# Patient Record
Sex: MALE | Race: WHITE | NOT HISPANIC OR LATINO | URBAN - METROPOLITAN AREA
[De-identification: names, ages, dates, MRNs, and addresses within clinical notes are randomized per-mention and may not be internally consistent; named-entity substitution may affect disease eponyms.]

---

## 2017-01-12 ENCOUNTER — EMERGENCY (EMERGENCY)
Facility: HOSPITAL | Age: 82
LOS: 1 days | Discharge: HOME | End: 2017-01-12
Attending: EMERGENCY MEDICINE
Payer: MEDICARE

## 2017-01-12 VITALS
WEIGHT: 210.1 LBS | DIASTOLIC BLOOD PRESSURE: 70 MMHG | OXYGEN SATURATION: 97 % | HEIGHT: 70 IN | RESPIRATION RATE: 20 BRPM | HEART RATE: 82 BPM | TEMPERATURE: 99 F | SYSTOLIC BLOOD PRESSURE: 141 MMHG

## 2017-01-12 DIAGNOSIS — N40.0 BENIGN PROSTATIC HYPERPLASIA WITHOUT LOWER URINARY TRACT SYMPTOMS: ICD-10-CM

## 2017-01-12 DIAGNOSIS — R33.9 RETENTION OF URINE, UNSPECIFIED: ICD-10-CM

## 2017-01-12 DIAGNOSIS — F02.80 DEMENTIA IN OTHER DISEASES CLASSIFIED ELSEWHERE, UNSPECIFIED SEVERITY, WITHOUT BEHAVIORAL DISTURBANCE, PSYCHOTIC DISTURBANCE, MOOD DISTURBANCE, AND ANXIETY: ICD-10-CM

## 2017-01-12 DIAGNOSIS — G20 PARKINSON'S DISEASE: ICD-10-CM

## 2017-01-12 DIAGNOSIS — G30.9 ALZHEIMER'S DISEASE, UNSPECIFIED: ICD-10-CM

## 2017-01-12 LAB
ANION GAP SERPL CALC-SCNC: 8 MMOL/L — SIGNIFICANT CHANGE UP (ref 5–17)
APPEARANCE UR: CLEAR — SIGNIFICANT CHANGE UP
BASOPHILS # BLD AUTO: 0.1 K/UL — SIGNIFICANT CHANGE UP (ref 0–0.2)
BASOPHILS NFR BLD AUTO: 0.8 % — SIGNIFICANT CHANGE UP (ref 0–2)
BILIRUB UR-MCNC: NEGATIVE — SIGNIFICANT CHANGE UP
BUN SERPL-MCNC: 23 MG/DL — HIGH (ref 7–18)
CALCIUM SERPL-MCNC: 8.3 MG/DL — LOW (ref 8.4–10.5)
CHLORIDE SERPL-SCNC: 113 MMOL/L — HIGH (ref 96–108)
CO2 SERPL-SCNC: 21 MMOL/L — LOW (ref 22–31)
COLOR SPEC: YELLOW — SIGNIFICANT CHANGE UP
CREAT SERPL-MCNC: 1.47 MG/DL — HIGH (ref 0.5–1.3)
DIFF PNL FLD: NEGATIVE — SIGNIFICANT CHANGE UP
EOSINOPHIL # BLD AUTO: 0.4 K/UL — SIGNIFICANT CHANGE UP (ref 0–0.5)
EOSINOPHIL NFR BLD AUTO: 4.3 % — SIGNIFICANT CHANGE UP (ref 0–6)
GLUCOSE SERPL-MCNC: 120 MG/DL — HIGH (ref 70–99)
GLUCOSE UR QL: NEGATIVE — SIGNIFICANT CHANGE UP
HCT VFR BLD CALC: 26.6 % — LOW (ref 39–50)
HGB BLD-MCNC: 8.2 G/DL — LOW (ref 13–17)
KETONES UR-MCNC: NEGATIVE — SIGNIFICANT CHANGE UP
LEUKOCYTE ESTERASE UR-ACNC: NEGATIVE — SIGNIFICANT CHANGE UP
LYMPHOCYTES # BLD AUTO: 2.2 K/UL — SIGNIFICANT CHANGE UP (ref 1–3.3)
LYMPHOCYTES # BLD AUTO: 22.8 % — SIGNIFICANT CHANGE UP (ref 13–44)
MCHC RBC-ENTMCNC: 23.1 PG — LOW (ref 27–34)
MCHC RBC-ENTMCNC: 30.7 GM/DL — LOW (ref 32–36)
MCV RBC AUTO: 75.3 FL — LOW (ref 80–100)
MONOCYTES # BLD AUTO: 0.7 K/UL — SIGNIFICANT CHANGE UP (ref 0–0.9)
MONOCYTES NFR BLD AUTO: 7.5 % — SIGNIFICANT CHANGE UP (ref 2–14)
NEUTROPHILS # BLD AUTO: 6.1 K/UL — SIGNIFICANT CHANGE UP (ref 1.8–7.4)
NEUTROPHILS NFR BLD AUTO: 64.7 % — SIGNIFICANT CHANGE UP (ref 43–77)
NITRITE UR-MCNC: NEGATIVE — SIGNIFICANT CHANGE UP
PH UR: 6 — SIGNIFICANT CHANGE UP (ref 4.8–8)
PLATELET # BLD AUTO: 243 K/UL — SIGNIFICANT CHANGE UP (ref 150–400)
POTASSIUM SERPL-MCNC: 4.2 MMOL/L — SIGNIFICANT CHANGE UP (ref 3.5–5.3)
POTASSIUM SERPL-SCNC: 4.2 MMOL/L — SIGNIFICANT CHANGE UP (ref 3.5–5.3)
PROT UR-MCNC: NEGATIVE — SIGNIFICANT CHANGE UP
RBC # BLD: 3.53 M/UL — LOW (ref 4.2–5.8)
RBC # FLD: 16.9 % — HIGH (ref 10.3–14.5)
SODIUM SERPL-SCNC: 142 MMOL/L — SIGNIFICANT CHANGE UP (ref 135–145)
SP GR SPEC: 1.01 — SIGNIFICANT CHANGE UP (ref 1.01–1.02)
UROBILINOGEN FLD QL: NEGATIVE — SIGNIFICANT CHANGE UP
WBC # BLD: 9.5 K/UL — SIGNIFICANT CHANGE UP (ref 3.8–10.5)
WBC # FLD AUTO: 9.5 K/UL — SIGNIFICANT CHANGE UP (ref 3.8–10.5)

## 2017-01-12 PROCEDURE — 99285 EMERGENCY DEPT VISIT HI MDM: CPT | Mod: 25

## 2017-01-12 PROCEDURE — 51702 INSERT TEMP BLADDER CATH: CPT

## 2017-01-12 PROCEDURE — 81003 URINALYSIS AUTO W/O SCOPE: CPT

## 2017-01-12 PROCEDURE — 80048 BASIC METABOLIC PNL TOTAL CA: CPT

## 2017-01-12 PROCEDURE — 85027 COMPLETE CBC AUTOMATED: CPT

## 2017-01-12 RX ORDER — SODIUM CHLORIDE 9 MG/ML
3 INJECTION INTRAMUSCULAR; INTRAVENOUS; SUBCUTANEOUS EVERY 8 HOURS
Qty: 0 | Refills: 0 | Status: DISCONTINUED | OUTPATIENT
Start: 2017-01-12 | End: 2017-01-16

## 2017-01-12 RX ADMIN — SODIUM CHLORIDE 3 MILLILITER(S): 9 INJECTION INTRAMUSCULAR; INTRAVENOUS; SUBCUTANEOUS at 23:33

## 2017-01-12 RX ADMIN — SODIUM CHLORIDE 3 MILLILITER(S): 9 INJECTION INTRAMUSCULAR; INTRAVENOUS; SUBCUTANEOUS at 22:36

## 2017-01-12 NOTE — ED PROVIDER NOTE - MUSCULOSKELETAL, MLM
Spine appears normal, range of motion is not limited in upper extremities, no muscle or joint tenderness

## 2017-01-12 NOTE — ED PROVIDER NOTE - MEDICAL DECISION MAKING DETAILS
87 y/o M pt c/o urinary retention. Reviewed outside labs, prior creatinine 1.42, will check CBC and BMP, place Solomon and send UA. Disposition dependent on worsening renal failure or UTI for possible admission, otherwise plan for D/C home by ambulette. 85 y/o M pt c/o urinary retention. Reviewed outside labs, prior creatinine 1.42, will check CBC and BMP, place Solomon and send UA. Disposition dependent on worsening renal failure or UTI for possible admission, otherwise plan for D/C home by ambulette. Cr at baseline. no UTI. pt to f/u with his urologist for urinary retention. Pt is well appearing, stable for discharge and follow up without fail with medical doctor. I had a detailed discussion with the patient and/or guardian regarding the historical points, exam findings, and any diagnostic results supporting the discharge diagnosis. Pt educated on care and need for follow up. Strict return instructions and red flag signs and symptoms discussed with patient. Medications for discharge discussed and adverse effects reviewed. Questions answered. Pt shows understanding of discharge information and agrees to follow.

## 2017-01-12 NOTE — ED ADULT NURSE NOTE - OBJECTIVE STATEMENT
Bpresented to ed with c/o unable to urinate x 24 hrs.bld.drawn and sent to lab. pedro cath in place draining to yellowish urine output with 1000cc

## 2017-01-12 NOTE — ED PROVIDER NOTE - PMH
Achilles rupture    Alzheimer's dementia    Anemia    BPH (benign prostatic hypertrophy)    CAD (coronary artery disease)    Parkinson's disease    Pneumonia    Seizure

## 2017-01-12 NOTE — ED PROVIDER NOTE - CRANIAL NERVE AND PUPILLARY EXAM
extra-ocular movements intact/Pt not moving lower extremities/central and peripheral vision intact/tongue is midline/corneal reflex intact/cranial nerves 2-12 intact

## 2017-01-12 NOTE — ED PROVIDER NOTE - NS ED MD SCRIBE ATTENDING SCRIBE SECTIONS
REVIEW OF SYSTEMS/PAST MEDICAL/SURGICAL/SOCIAL HISTORY/VITAL SIGNS( Pullset)/PHYSICAL EXAM/DISPOSITION/HISTORY OF PRESENT ILLNESS

## 2017-01-12 NOTE — ED PROVIDER NOTE - OBJECTIVE STATEMENT
85 y/o M pt w/ PMHx of Alzheimer's dementia, Parkinson's, seizures, anemia, CAD, and BPH presents to ED c/o worsening urinary retention x3 days. Per wife pt's last urination was today morning. Pt denies fever, chest pain, abd pain, dysuria, hematuria, or any other complaints. Pt is already on Tamsulosin for BPH and sees a Urologist at [facility]; pt has not seen Urologist recently due to bed bound status per wife. Pt reports feeling baseline, normal self, no complaints. NKDA.

## 2017-01-13 VITALS
TEMPERATURE: 99 F | RESPIRATION RATE: 20 BRPM | DIASTOLIC BLOOD PRESSURE: 67 MMHG | SYSTOLIC BLOOD PRESSURE: 117 MMHG | HEART RATE: 80 BPM | OXYGEN SATURATION: 100 %

## 2017-03-27 ENCOUNTER — EMERGENCY (EMERGENCY)
Facility: HOSPITAL | Age: 82
LOS: 1 days | Discharge: HOME | End: 2017-03-27
Attending: EMERGENCY MEDICINE
Payer: MEDICARE

## 2017-03-27 VITALS
RESPIRATION RATE: 18 BRPM | DIASTOLIC BLOOD PRESSURE: 57 MMHG | SYSTOLIC BLOOD PRESSURE: 100 MMHG | TEMPERATURE: 98 F | HEART RATE: 84 BPM | OXYGEN SATURATION: 98 %

## 2017-03-27 VITALS
HEIGHT: 70 IN | HEART RATE: 69 BPM | TEMPERATURE: 99 F | RESPIRATION RATE: 16 BRPM | OXYGEN SATURATION: 99 % | DIASTOLIC BLOOD PRESSURE: 78 MMHG | SYSTOLIC BLOOD PRESSURE: 121 MMHG | WEIGHT: 190.04 LBS

## 2017-03-27 LAB
ALBUMIN SERPL ELPH-MCNC: 3.6 G/DL — SIGNIFICANT CHANGE UP (ref 3.5–5)
ALP SERPL-CCNC: 89 U/L — SIGNIFICANT CHANGE UP (ref 40–120)
ALT FLD-CCNC: 9 U/L DA — LOW (ref 10–60)
ANION GAP SERPL CALC-SCNC: 10 MMOL/L — SIGNIFICANT CHANGE UP (ref 5–17)
APPEARANCE UR: CLEAR — SIGNIFICANT CHANGE UP
APTT BLD: 28.3 SEC — SIGNIFICANT CHANGE UP (ref 27.5–37.4)
AST SERPL-CCNC: 9 U/L — LOW (ref 10–40)
BASOPHILS # BLD AUTO: 0.1 K/UL — SIGNIFICANT CHANGE UP (ref 0–0.2)
BASOPHILS NFR BLD AUTO: 1 % — SIGNIFICANT CHANGE UP (ref 0–2)
BILIRUB SERPL-MCNC: 0.4 MG/DL — SIGNIFICANT CHANGE UP (ref 0.2–1.2)
BILIRUB UR-MCNC: NEGATIVE — SIGNIFICANT CHANGE UP
BUN SERPL-MCNC: 26 MG/DL — HIGH (ref 7–18)
CALCIUM SERPL-MCNC: 8.9 MG/DL — SIGNIFICANT CHANGE UP (ref 8.4–10.5)
CHLORIDE SERPL-SCNC: 112 MMOL/L — HIGH (ref 96–108)
CO2 SERPL-SCNC: 22 MMOL/L — SIGNIFICANT CHANGE UP (ref 22–31)
COLOR SPEC: YELLOW — SIGNIFICANT CHANGE UP
CREAT SERPL-MCNC: 1.64 MG/DL — HIGH (ref 0.5–1.3)
DIFF PNL FLD: NEGATIVE — SIGNIFICANT CHANGE UP
EOSINOPHIL # BLD AUTO: 0.3 K/UL — SIGNIFICANT CHANGE UP (ref 0–0.5)
EOSINOPHIL NFR BLD AUTO: 2.6 % — SIGNIFICANT CHANGE UP (ref 0–6)
GLUCOSE SERPL-MCNC: 104 MG/DL — HIGH (ref 70–99)
GLUCOSE UR QL: NEGATIVE — SIGNIFICANT CHANGE UP
HCT VFR BLD CALC: 37.6 % — LOW (ref 39–50)
HGB BLD-MCNC: 11.6 G/DL — LOW (ref 13–17)
INR BLD: 1.1 RATIO — SIGNIFICANT CHANGE UP (ref 0.88–1.16)
KETONES UR-MCNC: NEGATIVE — SIGNIFICANT CHANGE UP
LACTATE SERPL-SCNC: 1.5 MMOL/L — SIGNIFICANT CHANGE UP (ref 0.7–2)
LEUKOCYTE ESTERASE UR-ACNC: NEGATIVE — SIGNIFICANT CHANGE UP
LIDOCAIN IGE QN: 139 U/L — SIGNIFICANT CHANGE UP (ref 73–393)
LYMPHOCYTES # BLD AUTO: 2.8 K/UL — SIGNIFICANT CHANGE UP (ref 1–3.3)
LYMPHOCYTES # BLD AUTO: 27.8 % — SIGNIFICANT CHANGE UP (ref 13–44)
MCHC RBC-ENTMCNC: 26.1 PG — LOW (ref 27–34)
MCHC RBC-ENTMCNC: 30.8 GM/DL — LOW (ref 32–36)
MCV RBC AUTO: 84.8 FL — SIGNIFICANT CHANGE UP (ref 80–100)
MONOCYTES # BLD AUTO: 0.7 K/UL — SIGNIFICANT CHANGE UP (ref 0–0.9)
MONOCYTES NFR BLD AUTO: 6.7 % — SIGNIFICANT CHANGE UP (ref 2–14)
NEUTROPHILS # BLD AUTO: 6.2 K/UL — SIGNIFICANT CHANGE UP (ref 1.8–7.4)
NEUTROPHILS NFR BLD AUTO: 61.9 % — SIGNIFICANT CHANGE UP (ref 43–77)
NITRITE UR-MCNC: NEGATIVE — SIGNIFICANT CHANGE UP
PH UR: 6 — SIGNIFICANT CHANGE UP (ref 4.8–8)
PLATELET # BLD AUTO: 213 K/UL — SIGNIFICANT CHANGE UP (ref 150–400)
POTASSIUM SERPL-MCNC: 4.1 MMOL/L — SIGNIFICANT CHANGE UP (ref 3.5–5.3)
POTASSIUM SERPL-SCNC: 4.1 MMOL/L — SIGNIFICANT CHANGE UP (ref 3.5–5.3)
PROT SERPL-MCNC: 7.6 G/DL — SIGNIFICANT CHANGE UP (ref 6–8.3)
PROT UR-MCNC: NEGATIVE — SIGNIFICANT CHANGE UP
PROTHROM AB SERPL-ACNC: 12 SEC — SIGNIFICANT CHANGE UP (ref 9.8–12.7)
RBC # BLD: 4.43 M/UL — SIGNIFICANT CHANGE UP (ref 4.2–5.8)
RBC # FLD: 17.3 % — HIGH (ref 10.3–14.5)
SODIUM SERPL-SCNC: 144 MMOL/L — SIGNIFICANT CHANGE UP (ref 135–145)
SP GR SPEC: 1.02 — SIGNIFICANT CHANGE UP (ref 1.01–1.02)
UROBILINOGEN FLD QL: NEGATIVE — SIGNIFICANT CHANGE UP
WBC # BLD: 10.1 K/UL — SIGNIFICANT CHANGE UP (ref 3.8–10.5)
WBC # FLD AUTO: 10.1 K/UL — SIGNIFICANT CHANGE UP (ref 3.8–10.5)

## 2017-03-27 PROCEDURE — 80053 COMPREHEN METABOLIC PANEL: CPT

## 2017-03-27 PROCEDURE — 83690 ASSAY OF LIPASE: CPT

## 2017-03-27 PROCEDURE — 83605 ASSAY OF LACTIC ACID: CPT

## 2017-03-27 PROCEDURE — 99285 EMERGENCY DEPT VISIT HI MDM: CPT

## 2017-03-27 PROCEDURE — 81003 URINALYSIS AUTO W/O SCOPE: CPT

## 2017-03-27 PROCEDURE — 99284 EMERGENCY DEPT VISIT MOD MDM: CPT | Mod: 25

## 2017-03-27 PROCEDURE — 74176 CT ABD & PELVIS W/O CONTRAST: CPT | Mod: 26

## 2017-03-27 PROCEDURE — 85027 COMPLETE CBC AUTOMATED: CPT

## 2017-03-27 PROCEDURE — 85610 PROTHROMBIN TIME: CPT

## 2017-03-27 PROCEDURE — 74176 CT ABD & PELVIS W/O CONTRAST: CPT

## 2017-03-27 PROCEDURE — 85730 THROMBOPLASTIN TIME PARTIAL: CPT

## 2017-03-27 RX ORDER — SODIUM CHLORIDE 9 MG/ML
500 INJECTION INTRAMUSCULAR; INTRAVENOUS; SUBCUTANEOUS ONCE
Qty: 0 | Refills: 0 | Status: COMPLETED | OUTPATIENT
Start: 2017-03-27 | End: 2017-03-27

## 2017-03-27 RX ADMIN — SODIUM CHLORIDE 500 MILLILITER(S): 9 INJECTION INTRAMUSCULAR; INTRAVENOUS; SUBCUTANEOUS at 16:31

## 2017-03-27 NOTE — ED PROVIDER NOTE - PHYSICAL EXAMINATION
GI: no grimacing during abdominal palpation; normal bowel sounds   MSKL: no grimacing during palpation of calves

## 2017-03-27 NOTE — ED PROVIDER NOTE - MEDICAL DECISION MAKING DETAILS
85 y/o M with intermittent abdominal distension x 3 days. Pt is Pt is otherwise well appearing, vital signs are within normal limits and afebrile.  Plan to obtain labs, urine, CT to r/o obstruction and reassess.

## 2017-03-27 NOTE — ED PROVIDER NOTE - PROGRESS NOTE DETAILS
The scribe's documentation has been prepared under my direction and personally reviewed by me in its entirety. I confirm that the note above actually reflects all work, treatment, procedures, and medical decision-making performed by me - THA Renee Creat 1.64, will give  cc. CT shows rectal distention with stool. Performed digital exam and removed moderate amount of brown stool, non-tender rectal area, will give enema and discharge with PMD follow up. Patient already on Colace and wife said that she will restart the lactulose. Will book ambulette and give a copy of all results to wife.

## 2017-03-27 NOTE — ED PROVIDER NOTE - CONDUCTED A DETAILED DISCUSSION WITH PATIENT AND/OR GUARDIAN REGARDING, MDM
return to ED if symptoms worsen, persist or questions arise need for outpatient follow-up/lab results/return to ED if symptoms worsen, persist or questions arise/radiology results

## 2017-03-27 NOTE — ED PROVIDER NOTE - OBJECTIVE STATEMENT
87 y/o M pt with PMHx of dementia, parkinson's disease, BPH, and cardiac stent on aspirin presents to the ED BIB family for abdominal distention x 2 days. Pt's wife notes relief yesterday after BM, however, distension returned today. Denies SOB, fever, skin flushing, no bloody/ black stools or any other complaints at this time. NKDA.  nonverbal therefore limited HPI 87 y/o M pt with PMHx of advanced dementia, parkinson's disease, BPH, and cardiac stent on aspirin presents to the ED BIB family for abdominal distention x 2 days. Pt's wife notes relief yesterday after BM, however, distension returned today. Wife and aide deny witnessing SOB, fever, skin flushing, no bloody/ black stools or any other complaints at this time. NKDA.  nonverbal therefore limited HPI

## 2017-03-27 NOTE — ED PROVIDER NOTE - ATTENDING CONTRIBUTION TO CARE
patient with abdominal distension. nontender abdomen on palpation, nonverbal patient. CT reveals large amount of stool in vault. some stool removed in Emergency Department digitally by Fabiola. home with primary care physician followup

## 2017-03-27 NOTE — ED PROVIDER NOTE - NS ED MD SCRIBE ATTENDING SCRIBE SECTIONS
HISTORY OF PRESENT ILLNESS/REVIEW OF SYSTEMS/VITAL SIGNS( Pullset)/DISPOSITION/PHYSICAL EXAM/PAST MEDICAL/SURGICAL/SOCIAL HISTORY

## 2017-03-27 NOTE — ED ADULT NURSE NOTE - OBJECTIVE STATEMENT
RN HERBER SY covering notes: Patient received via ambulance as per wife patient been complaining of abdominal pain denies any NVD. Patient is AOX1 non ambulatory noted with abdominal distention

## 2017-03-31 DIAGNOSIS — K59.00 CONSTIPATION, UNSPECIFIED: ICD-10-CM

## 2017-03-31 DIAGNOSIS — D64.9 ANEMIA, UNSPECIFIED: ICD-10-CM

## 2017-03-31 DIAGNOSIS — R14.0 ABDOMINAL DISTENSION (GASEOUS): ICD-10-CM

## 2017-03-31 DIAGNOSIS — G30.9 ALZHEIMER'S DISEASE, UNSPECIFIED: ICD-10-CM

## 2017-03-31 DIAGNOSIS — G40.909 EPILEPSY, UNSPECIFIED, NOT INTRACTABLE, WITHOUT STATUS EPILEPTICUS: ICD-10-CM

## 2017-03-31 DIAGNOSIS — I25.10 ATHEROSCLEROTIC HEART DISEASE OF NATIVE CORONARY ARTERY WITHOUT ANGINA PECTORIS: ICD-10-CM

## 2017-03-31 DIAGNOSIS — G20 PARKINSON'S DISEASE: ICD-10-CM

## 2017-03-31 DIAGNOSIS — N40.0 BENIGN PROSTATIC HYPERPLASIA WITHOUT LOWER URINARY TRACT SYMPTOMS: ICD-10-CM

## 2017-03-31 DIAGNOSIS — F02.80 DEMENTIA IN OTHER DISEASES CLASSIFIED ELSEWHERE, UNSPECIFIED SEVERITY, WITHOUT BEHAVIORAL DISTURBANCE, PSYCHOTIC DISTURBANCE, MOOD DISTURBANCE, AND ANXIETY: ICD-10-CM

## 2017-09-18 ENCOUNTER — INPATIENT (INPATIENT)
Facility: HOSPITAL | Age: 82
LOS: 1 days | Discharge: HOSPICE | DRG: 871 | End: 2017-09-20
Attending: INTERNAL MEDICINE | Admitting: INTERNAL MEDICINE
Payer: MEDICARE

## 2017-09-18 VITALS
HEART RATE: 98 BPM | DIASTOLIC BLOOD PRESSURE: 57 MMHG | OXYGEN SATURATION: 95 % | RESPIRATION RATE: 26 BRPM | SYSTOLIC BLOOD PRESSURE: 85 MMHG | WEIGHT: 220.02 LBS

## 2017-09-18 DIAGNOSIS — G93.41 METABOLIC ENCEPHALOPATHY: ICD-10-CM

## 2017-09-18 DIAGNOSIS — N17.9 ACUTE KIDNEY FAILURE, UNSPECIFIED: ICD-10-CM

## 2017-09-18 DIAGNOSIS — J18.9 PNEUMONIA, UNSPECIFIED ORGANISM: ICD-10-CM

## 2017-09-18 DIAGNOSIS — G20 PARKINSON'S DISEASE: ICD-10-CM

## 2017-09-18 DIAGNOSIS — N28.9 DISORDER OF KIDNEY AND URETER, UNSPECIFIED: ICD-10-CM

## 2017-09-18 DIAGNOSIS — E87.0 HYPEROSMOLALITY AND HYPERNATREMIA: ICD-10-CM

## 2017-09-18 DIAGNOSIS — R53.2 FUNCTIONAL QUADRIPLEGIA: ICD-10-CM

## 2017-09-18 DIAGNOSIS — R62.7 ADULT FAILURE TO THRIVE: ICD-10-CM

## 2017-09-18 DIAGNOSIS — R13.10 DYSPHAGIA, UNSPECIFIED: ICD-10-CM

## 2017-09-18 DIAGNOSIS — R13.12 DYSPHAGIA, OROPHARYNGEAL PHASE: ICD-10-CM

## 2017-09-18 DIAGNOSIS — Z29.9 ENCOUNTER FOR PROPHYLACTIC MEASURES, UNSPECIFIED: ICD-10-CM

## 2017-09-18 DIAGNOSIS — G93.40 ENCEPHALOPATHY, UNSPECIFIED: ICD-10-CM

## 2017-09-18 DIAGNOSIS — I25.10 ATHEROSCLEROTIC HEART DISEASE OF NATIVE CORONARY ARTERY WITHOUT ANGINA PECTORIS: ICD-10-CM

## 2017-09-18 DIAGNOSIS — Z51.5 ENCOUNTER FOR PALLIATIVE CARE: ICD-10-CM

## 2017-09-18 DIAGNOSIS — Z71.89 OTHER SPECIFIED COUNSELING: ICD-10-CM

## 2017-09-18 LAB
ALBUMIN SERPL ELPH-MCNC: 2.8 G/DL — LOW (ref 3.5–5)
ALP SERPL-CCNC: 75 U/L — SIGNIFICANT CHANGE UP (ref 40–120)
ALT FLD-CCNC: 7 U/L DA — LOW (ref 10–60)
ANION GAP SERPL CALC-SCNC: 13 MMOL/L — SIGNIFICANT CHANGE UP (ref 5–17)
ANION GAP SERPL CALC-SCNC: 14 MMOL/L — SIGNIFICANT CHANGE UP (ref 5–17)
APPEARANCE UR: CLEAR — SIGNIFICANT CHANGE UP
AST SERPL-CCNC: 34 U/L — SIGNIFICANT CHANGE UP (ref 10–40)
BASOPHILS NFR BLD AUTO: 2 % — SIGNIFICANT CHANGE UP (ref 0–2)
BILIRUB SERPL-MCNC: 0.5 MG/DL — SIGNIFICANT CHANGE UP (ref 0.2–1.2)
BILIRUB UR-MCNC: NEGATIVE — SIGNIFICANT CHANGE UP
BUN SERPL-MCNC: 138 MG/DL — HIGH (ref 7–18)
BUN SERPL-MCNC: 139 MG/DL — HIGH (ref 7–18)
CALCIUM SERPL-MCNC: 8.8 MG/DL — SIGNIFICANT CHANGE UP (ref 8.4–10.5)
CALCIUM SERPL-MCNC: 9.5 MG/DL — SIGNIFICANT CHANGE UP (ref 8.4–10.5)
CHLORIDE SERPL-SCNC: 136 MMOL/L — HIGH (ref 96–108)
CHLORIDE SERPL-SCNC: 137 MMOL/L — HIGH (ref 96–108)
CO2 SERPL-SCNC: 16 MMOL/L — LOW (ref 22–31)
CO2 SERPL-SCNC: 16 MMOL/L — LOW (ref 22–31)
COLOR SPEC: YELLOW — SIGNIFICANT CHANGE UP
CREAT SERPL-MCNC: 6.44 MG/DL — HIGH (ref 0.5–1.3)
CREAT SERPL-MCNC: 6.58 MG/DL — HIGH (ref 0.5–1.3)
DIFF PNL FLD: NEGATIVE — SIGNIFICANT CHANGE UP
EOSINOPHIL NFR BLD AUTO: 2 % — SIGNIFICANT CHANGE UP (ref 0–6)
GLUCOSE SERPL-MCNC: 147 MG/DL — HIGH (ref 70–99)
GLUCOSE SERPL-MCNC: 173 MG/DL — HIGH (ref 70–99)
GLUCOSE UR QL: NEGATIVE — SIGNIFICANT CHANGE UP
HCT VFR BLD CALC: 29.9 % — LOW (ref 39–50)
HGB BLD-MCNC: 9.1 G/DL — LOW (ref 13–17)
KETONES UR-MCNC: NEGATIVE — SIGNIFICANT CHANGE UP
LACTATE SERPL-SCNC: 2.2 MMOL/L — HIGH (ref 0.7–2)
LACTATE SERPL-SCNC: 2.9 MMOL/L — HIGH (ref 0.7–2)
LACTATE SERPL-SCNC: 3.3 MMOL/L — HIGH (ref 0.7–2)
LEUKOCYTE ESTERASE UR-ACNC: ABNORMAL
LYMPHOCYTES # BLD AUTO: 24 % — SIGNIFICANT CHANGE UP (ref 13–44)
MCHC RBC-ENTMCNC: 23.6 PG — LOW (ref 27–34)
MCHC RBC-ENTMCNC: 30.4 GM/DL — LOW (ref 32–36)
MCV RBC AUTO: 77.7 FL — LOW (ref 80–100)
MONOCYTES NFR BLD AUTO: 6 % — SIGNIFICANT CHANGE UP (ref 2–14)
NEUTROPHILS NFR BLD AUTO: 65 % — SIGNIFICANT CHANGE UP (ref 43–77)
NITRITE UR-MCNC: NEGATIVE — SIGNIFICANT CHANGE UP
OSMOLALITY SERPL: 397 MOS/KG — HIGH (ref 275–300)
PH UR: 5 — SIGNIFICANT CHANGE UP (ref 5–8)
PLATELET # BLD AUTO: 305 K/UL — SIGNIFICANT CHANGE UP (ref 150–400)
POTASSIUM SERPL-MCNC: 4.5 MMOL/L — SIGNIFICANT CHANGE UP (ref 3.5–5.3)
POTASSIUM SERPL-MCNC: 4.9 MMOL/L — SIGNIFICANT CHANGE UP (ref 3.5–5.3)
POTASSIUM SERPL-SCNC: 4.5 MMOL/L — SIGNIFICANT CHANGE UP (ref 3.5–5.3)
POTASSIUM SERPL-SCNC: 4.9 MMOL/L — SIGNIFICANT CHANGE UP (ref 3.5–5.3)
PROT SERPL-MCNC: 8.1 G/DL — SIGNIFICANT CHANGE UP (ref 6–8.3)
PROT UR-MCNC: NEGATIVE — SIGNIFICANT CHANGE UP
RBC # BLD: 3.85 M/UL — LOW (ref 4.2–5.8)
RBC # FLD: 17 % — HIGH (ref 10.3–14.5)
SODIUM SERPL-SCNC: 165 MMOL/L — CRITICAL HIGH (ref 135–145)
SODIUM SERPL-SCNC: 167 MMOL/L — CRITICAL HIGH (ref 135–145)
SP GR SPEC: 1.02 — SIGNIFICANT CHANGE UP (ref 1.01–1.02)
UROBILINOGEN FLD QL: NEGATIVE — SIGNIFICANT CHANGE UP
WBC # BLD: 19.1 K/UL — HIGH (ref 3.8–10.5)
WBC # FLD AUTO: 19.1 K/UL — HIGH (ref 3.8–10.5)

## 2017-09-18 PROCEDURE — 99223 1ST HOSP IP/OBS HIGH 75: CPT

## 2017-09-18 PROCEDURE — 71250 CT THORAX DX C-: CPT | Mod: 26

## 2017-09-18 PROCEDURE — 99285 EMERGENCY DEPT VISIT HI MDM: CPT

## 2017-09-18 PROCEDURE — 70450 CT HEAD/BRAIN W/O DYE: CPT | Mod: 26

## 2017-09-18 PROCEDURE — 71010: CPT | Mod: 26

## 2017-09-18 RX ORDER — SODIUM CHLORIDE 9 MG/ML
1000 INJECTION, SOLUTION INTRAVENOUS
Qty: 0 | Refills: 0 | Status: DISCONTINUED | OUTPATIENT
Start: 2017-09-18 | End: 2017-09-18

## 2017-09-18 RX ORDER — VANCOMYCIN HCL 1 G
1000 VIAL (EA) INTRAVENOUS ONCE
Qty: 0 | Refills: 0 | Status: COMPLETED | OUTPATIENT
Start: 2017-09-18 | End: 2017-09-18

## 2017-09-18 RX ORDER — FUROSEMIDE 40 MG
1 TABLET ORAL
Qty: 0 | Refills: 0 | COMMUNITY

## 2017-09-18 RX ORDER — CARBIDOPA AND LEVODOPA 25; 100 MG/1; MG/1
1 TABLET ORAL
Qty: 0 | Refills: 0 | Status: DISCONTINUED | OUTPATIENT
Start: 2017-09-18 | End: 2017-09-19

## 2017-09-18 RX ORDER — METRONIDAZOLE 500 MG
500 TABLET ORAL EVERY 8 HOURS
Qty: 0 | Refills: 0 | Status: DISCONTINUED | OUTPATIENT
Start: 2017-09-18 | End: 2017-09-20

## 2017-09-18 RX ORDER — AZITHROMYCIN 500 MG/1
500 TABLET, FILM COATED ORAL EVERY 24 HOURS
Qty: 0 | Refills: 0 | Status: DISCONTINUED | OUTPATIENT
Start: 2017-09-19 | End: 2017-09-20

## 2017-09-18 RX ORDER — HEPARIN SODIUM 5000 [USP'U]/ML
5000 INJECTION INTRAVENOUS; SUBCUTANEOUS EVERY 12 HOURS
Qty: 0 | Refills: 0 | Status: DISCONTINUED | OUTPATIENT
Start: 2017-09-18 | End: 2017-09-19

## 2017-09-18 RX ORDER — ASPIRIN/CALCIUM CARB/MAGNESIUM 324 MG
81 TABLET ORAL DAILY
Qty: 0 | Refills: 0 | Status: DISCONTINUED | OUTPATIENT
Start: 2017-09-18 | End: 2017-09-19

## 2017-09-18 RX ORDER — AZITHROMYCIN 500 MG/1
500 TABLET, FILM COATED ORAL ONCE
Qty: 0 | Refills: 0 | Status: COMPLETED | OUTPATIENT
Start: 2017-09-18 | End: 2017-09-18

## 2017-09-18 RX ORDER — AZITHROMYCIN 500 MG/1
TABLET, FILM COATED ORAL
Qty: 0 | Refills: 0 | Status: DISCONTINUED | OUTPATIENT
Start: 2017-09-18 | End: 2017-09-20

## 2017-09-18 RX ORDER — SODIUM CHLORIDE 9 MG/ML
1000 INJECTION, SOLUTION INTRAVENOUS
Qty: 0 | Refills: 0 | Status: DISCONTINUED | OUTPATIENT
Start: 2017-09-18 | End: 2017-09-19

## 2017-09-18 RX ORDER — PIPERACILLIN AND TAZOBACTAM 4; .5 G/20ML; G/20ML
3.38 INJECTION, POWDER, LYOPHILIZED, FOR SOLUTION INTRAVENOUS ONCE
Qty: 0 | Refills: 0 | Status: COMPLETED | OUTPATIENT
Start: 2017-09-18 | End: 2017-09-18

## 2017-09-18 RX ORDER — CEFTRIAXONE 500 MG/1
1 INJECTION, POWDER, FOR SOLUTION INTRAMUSCULAR; INTRAVENOUS EVERY 24 HOURS
Qty: 0 | Refills: 0 | Status: DISCONTINUED | OUTPATIENT
Start: 2017-09-18 | End: 2017-09-20

## 2017-09-18 RX ORDER — METRONIDAZOLE 500 MG
500 TABLET ORAL ONCE
Qty: 0 | Refills: 0 | Status: COMPLETED | OUTPATIENT
Start: 2017-09-18 | End: 2017-09-18

## 2017-09-18 RX ORDER — METRONIDAZOLE 500 MG
TABLET ORAL
Qty: 0 | Refills: 0 | Status: DISCONTINUED | OUTPATIENT
Start: 2017-09-18 | End: 2017-09-20

## 2017-09-18 RX ORDER — MORPHINE SULFATE 50 MG/1
1 CAPSULE, EXTENDED RELEASE ORAL
Qty: 0 | Refills: 0 | Status: DISCONTINUED | OUTPATIENT
Start: 2017-09-18 | End: 2017-09-19

## 2017-09-18 RX ADMIN — AZITHROMYCIN 250 MILLIGRAM(S): 500 TABLET, FILM COATED ORAL at 23:04

## 2017-09-18 RX ADMIN — SODIUM CHLORIDE 100 MILLILITER(S): 9 INJECTION, SOLUTION INTRAVENOUS at 15:20

## 2017-09-18 RX ADMIN — HEPARIN SODIUM 5000 UNIT(S): 5000 INJECTION INTRAVENOUS; SUBCUTANEOUS at 18:09

## 2017-09-18 RX ADMIN — SODIUM CHLORIDE 50 MILLILITER(S): 9 INJECTION, SOLUTION INTRAVENOUS at 12:49

## 2017-09-18 RX ADMIN — Medication 100 MILLIGRAM(S): at 23:06

## 2017-09-18 RX ADMIN — Medication 250 MILLIGRAM(S): at 12:50

## 2017-09-18 RX ADMIN — Medication 100 MILLIGRAM(S): at 14:21

## 2017-09-18 RX ADMIN — SODIUM CHLORIDE 70 MILLILITER(S): 9 INJECTION, SOLUTION INTRAVENOUS at 14:21

## 2017-09-18 RX ADMIN — PIPERACILLIN AND TAZOBACTAM 200 GRAM(S): 4; .5 INJECTION, POWDER, LYOPHILIZED, FOR SOLUTION INTRAVENOUS at 12:50

## 2017-09-18 NOTE — H&P ADULT - PROBLEM SELECTOR PLAN 7
IMPROVE SCORE OF 2 and 3 month risk of 0.6 % , c/w dvt prophylaxis's - c/w carbidopa and levodopa for now

## 2017-09-18 NOTE — H&P ADULT - ASSESSMENT
85 yo with PMH of alzheimer's disease , parkinson's disease , CAD s/p stent 11 years ago  presented with lethargy 87 yo with PMH of alzheimer's disease , parkinson's disease , CAD s/p stent 11 years ago  presented with lethargy and poor PO intake will admit the patient  for hypernatremia secondary to poor po intake and JEREMY and failure to thrive in adult . 87 yo with PMH of alzheimer's disease, parkinson's disease, CAD s/p stent 11 years ago  presented with lethargy and poor PO intake will admit the patient for hypernatremia secondary to poor po intake, JEREMY and failure to thrive in adult . 87 yo with PMH of alzheimer's disease, parkinson's disease, CAD s/p stent 11 years ago  presented with lethargy and poor PO intake will admit the patient for hypernatremia secondary to poor po intake, sepsis due to PNA, JEREMY, and failure to thrive in adult

## 2017-09-18 NOTE — H&P ADULT - PROBLEM SELECTOR PLAN 1
Likely 2/2 poor oral intake and severe dehydration and metabolic encepahlopathy likely also secondary to elevated sodium   Free water deficit 9.6 L{ plan to replace free water deficit in 48-72 hours }  NPO for now because of risk of aspiration  will c/w d5 and 1/2 ns @ 100 ml/hr   - f/u serum osmolality and urine osmolality and urine lytes   -monitor urine output  -strict i/o   -monitor BMP CLOSELY q 6 hours   -nephrology - Likely 2/2 poor oral intake and severe dehydration and metabolic encephalopathy likely also secondary to elevated sodium   Free water deficit 9.6 L{ plan to replace free water deficit in 48-72 hours }  NPO for now because of risk of aspiration  will c/w d5 and 1/2 ns @ 100 ml/hr   - f/u serum osmolality and urine osmolality and urine lytes   -monitor urine output  -strict i/o   -monitor BMP CLOSELY q 6 hours   -nephrology -DR. URIAS

## 2017-09-18 NOTE — ED ADULT NURSE NOTE - OBJECTIVE STATEMENT
pt BIBEMS from home for AMS. as per wife pt is more alert and with it. pt has not been his usual self x2 days. deny fever, chills. pt with decreased appetite. stage 2 sacral ulcer with spot of eschar and stage 2 right buttock ulcer.

## 2017-09-18 NOTE — H&P ADULT - PROBLEM SELECTOR PLAN 8
- patient wife ALEIDA health care proxy and patient dnr/dni and will c/w comfort measures and palliative care team

## 2017-09-18 NOTE — CONSULT NOTE ADULT - PROBLEM SELECTOR RECOMMENDATION 2
Metabolic vs infectious encephalopathy  Hypernatremia and Uremia  sepsis with Aspiration PNA  qSOFA 2  CT chest shows LLL pna  Afebrile, leukocytosis  Given Zosyn x1  C/w ceftriaxone and flagyl  NPO  f/u Speech swallow consult  F/U CMP daily  f/u BCx
as above follow
pt has recent hx of RI but with pt not being able to take FLOMAX and very little PO Intake, pt's JEREMY is expected to be increased however, this is a significant increase, agree with nephro consult but wife does not want dialysis.

## 2017-09-18 NOTE — CONSULT NOTE ADULT - SUBJECTIVE AND OBJECTIVE BOX
HPI:  86y Male from home, bed bound, contracted has HHA 11 hrs 7 days a week, history of Parkinson disease, Alzheimer dementia, CAD s/p stent (11 years ago) brought in by home health aid for altered mental status and poor PO intake and lethargy . As per HHA at bedside patient has poor PO intake for last 1 month which has been gradually worsening for last 2 weeks and patient also noted to have cough for last few days and unable to feed because patient HHA complaints of cough and gurgling while trying to feed him . HHA at bed side denies any chest pain ,shortness of breath , nausea , vomiting any blood in stools or dysuria or hematuria or any other active complaints .  IN the E.D patient vitals hypotensive 85/57 and HR-98 and RR-26  and pulse oximetry -98 and labs showed a WBC count of 19.1 and lactate -2.9 and elevated BUN AND CREATI -138/6.68 and ICU  was consulted for the patient and ICU evaluated the patient and downgraded the patient after initial evaluation and spoke to patient wife ALEIDA - [phone] HCP and she makes the decision for DNR/DNI and palliative care consulted (18 Sep 2017 14:29)      PAST MEDICAL & SURGICAL HISTORY:  BPH (benign prostatic hypertrophy)  Anemia  Seizure  Pneumonia  Achilles rupture  CAD (coronary artery disease)  Alzheimer's dementia  Parkinson's disease  S/P routine circumcision:   Stented coronary artery: x1 ten yrs ago      SOCIAL HISTORY:    Admitted from:  home assisted living [facility]   Substance abuse history:              Tobacco hx:                  Alcohol hx:              Home Opioid hx:  Ep[employer]:                                    Preferred Language:    Surrogate/HCP/Guardian:            Phone#:    FAMILY HISTORY:  Family history of Parkinson's disease (Sibling)    Baseline ADLs (prior to admission):    Allergies    No Known Allergies    Intolerances      Present Symptoms:   Dyspnea:   Nausea/Vomiting:   Anxiety:  Depressed   Fatigue:  Loss of appetite:   Pain:                                location:          Review of Systems: [All others negative or Unable to obtain due to poor mentation]    MEDICATIONS  (STANDING):  cefTRIAXone   IVPB 1 Gram(s) IV Intermittent every 24 hours  metroNIDAZOLE  IVPB      heparin  Injectable 5000 Unit(s) SubCutaneous every 12 hours  metroNIDAZOLE  IVPB 500 milliGRAM(s) IV Intermittent every 8 hours  dextrose 5% + sodium chloride 0.45%. 1000 milliLiter(s) (100 mL/Hr) IV Continuous <Continuous>    MEDICATIONS  (PRN):      PHYSICAL EXAM:    Vital Signs Last 24 Hrs  T(C): --  T(F): --  HR: 98 (18 Sep 2017 10:37) (98 - 98)  BP: 85/57 (18 Sep 2017 10:37) (85/57 - 85/57)  BP(mean): --  RR: 26 (18 Sep 2017 10:37) (26 - 26)  SpO2: 95% (18 Sep 2017 10:37) (95% - 95%)    General: alert  oriented x ____    [lethargic distressed cachexia  nonverbal  unarousable verbal]  Karnofsky Performance Score/Palliative Performance Status Version2:     %    HEENT: normal  dry mouth  ET tube/trach oral lesions:  Lungs: comfortable tachypnea/labored breathing  excessive secretions  CV: normal  tachycardia  GI: normal  distended  tender  incontinent               PEG/NG/OG tube  constipation  last BM:   : normal  incontinent  oliguria/anuria  pedro  Musculoskeletal: normal  weakness  edema             ambulatory  bedbound/wheelchair bound  Skin: normal  pressure ulcers: stage: edema: other:  Neuro: no deficits cognitive impairment dsyphagia/dysarthria paresis: other:  Oral intake ability: unable/only mouth care [minimal moderate full capability]  Diet: [NPO]    LABS:                        9.1    19.1  )-----------( 305      ( 18 Sep 2017 11:07 )             29.9     -18    167<HH>  |  137<H>  |  139<H>  ----------------------------<  147<H>  4.9   |  16<L>  |  6.58<H>    Ca    9.5      18 Sep 2017 11:07    TPro  8.1  /  Alb  2.8<L>  /  TBili  0.5  /  DBili  x   /  AST  34  /  ALT  7<L>  /  AlkPhos  75      Urinalysis Basic - ( 18 Sep 2017 11:38 )    Color: Yellow / Appearance: Clear / S.020 / pH: x  Gluc: x / Ketone: Negative  / Bili: Negative / Urobili: Negative   Blood: x / Protein: Negative / Nitrite: Negative   Leuk Esterase: Trace / RBC: x / WBC 0-2 /HPF   Sq Epi: x / Non Sq Epi: Few / Bacteria: Trace /HPF        RADIOLOGY & ADDITIONAL STUDIES:    ADVANCE DIRECTIVES:   Advanced Care Planning discussion total time spent:    Prolonged Service: Non-Face-to-Face: Time start (    )Time ended (   ) Total Time: (   ) HPI:  86y Male from home, bed bound, contracted has HHA 11 hrs 7 days a week, history of Parkinson disease, Alzheimer dementia, CAD s/p stent (11 years ago) brought in by home health aid for altered mental status and poor PO intake and lethargy . As per HHA at bedside patient has poor PO intake for last 1 month which has been gradually worsening for last 2 weeks and patient also noted to have cough for last few days and unable to feed because patient HHA complaints of cough and gurgling while trying to feed him . HHA at bed side denies any chest pain ,shortness of breath , nausea , vomiting any blood in stools or dysuria or hematuria or any other active complaints .  IN the E.D patient vitals hypotensive 85/57 and HR-98 and RR-26  and pulse oximetry -98 and labs showed a WBC count of 19.1 and lactate -2.9 and elevated BUN AND CREATI -138/6.68 and ICU  was consulted for the patient and ICU evaluated the patient and downgraded the patient after initial evaluation and spoke to patient wife BRITANY - [phone] HCP and she makes the decision for DNR/DNI and palliative care consulted (18 Sep 2017 14:29)      PAST MEDICAL & SURGICAL HISTORY:  BPH (benign prostatic hypertrophy)  Anemia  Seizure  Pneumonia  Achilles rupture  CAD (coronary artery disease)  Alzheimer's dementia  Parkinson's disease  S/P routine circumcision:   Stented coronary artery: x1 ten yrs ago      SOCIAL HISTORY:  lives with wife, has adult son with 11 hr 7day HHAs  Admitted from:  home      HCP:  Britany Jeong          Phone#: [phone]    FAMILY HISTORY:  Family history of Parkinson's disease (Sibling)    Baseline ADLs (prior to admission): bedbound, functional quad    Allergies    No Known Allergies    Intolerances      Present Symptoms:    Unable to obtain due to poor mentation]    MEDICATIONS  (STANDING):  cefTRIAXone   IVPB 1 Gram(s) IV Intermittent every 24 hours  metroNIDAZOLE  IVPB      heparin  Injectable 5000 Unit(s) SubCutaneous every 12 hours  metroNIDAZOLE  IVPB 500 milliGRAM(s) IV Intermittent every 8 hours  dextrose 5% + sodium chloride 0.45%. 1000 milliLiter(s) (100 mL/Hr) IV Continuous <Continuous>    MEDICATIONS  (PRN):      PHYSICAL EXAM:    Vital Signs Last 24 Hrs  T(C): --  T(F): --  HR: 98 (18 Sep 2017 10:37) (98 - 98)  BP: 85/57 (18 Sep 2017 10:37) (85/57 - 85/57)  BP(mean): --  RR: 26 (18 Sep 2017 10:37) (26 - 26)  SpO2: 95% (18 Sep 2017 10:37) (95% - 95%)    General:  [lethargic  nonverbal  alert and looking around seems to recognize HHA]  Karnofsky Performance Score/Palliative Performance Status Version2:  30  %    HEENT: dry mouth    Lungs: comfortable wet cough  CV: normal    GI:  incontine           : incontinent    Musculoskeletal: weakness x4 bedbound  Skin: normal    Neuro: severe cognitive impairment dsyphagia  Oral intake ability: unable/only mouth care   Diet: [NPO]    LABS:                        9.1    19.1  )-----------( 305      ( 18 Sep 2017 11:07 )             29.9         167<HH>  |  137<H>  |  139<H>  ----------------------------<  147<H>  4.9   |  16<L>  |  6.58<H>    Ca    9.5      18 Sep 2017 11:07    TPro  8.1  /  Alb  2.8<L>  /  TBili  0.5  /  DBili  x   /  AST  34  /  ALT  7<L>  /  AlkPhos  75      Urinalysis Basic - ( 18 Sep 2017 11:38 )    Color: Yellow / Appearance: Clear / S.020 / pH: x  Gluc: x / Ketone: Negative  / Bili: Negative / Urobili: Negative   Blood: x / Protein: Negative / Nitrite: Negative   Leuk Esterase: Trace / RBC: x / WBC 0-2 /HPF   Sq Epi: x / Non Sq Epi: Few / Bacteria: Trace /HPF        RADIOLOGY & ADDITIONAL STUDIES:    ADVANCE DIRECTIVES:   Advanced Care Planning discussion total time spent:    Prolonged Service: Non-Face-to-Face: Time start (    )Time ended (   ) Total Time: (   )

## 2017-09-18 NOTE — CONSULT NOTE ADULT - PROBLEM SELECTOR RECOMMENDATION 9
likely due to progressive parkinson's and sepsis (pt has pneumonia) likely due to progressive parkinson's and sepsis (pt has pneumonia), progressive dysphagia with past month and garett in past two weeks with hardly any po intake - wife who is HCP is very clear on no artificial nutrition, ok with IV hydration for and NPO and speech and swallow eval

## 2017-09-18 NOTE — CONSULT NOTE ADULT - PROBLEM SELECTOR RECOMMENDATION 3
Prerenal   BUN: Cr >20  Given 2 L NS  f/U urine lytes  c/w hydartion  f/u BMP  consider calling nephro as per PMD
cont IV ABX.
progressive with dementia FAST 7c - eligible for home hospice.

## 2017-09-18 NOTE — CONSULT NOTE ADULT - SUBJECTIVE AND OBJECTIVE BOX
ICU consult:    COLIN THAO is a 86y Male from home, bed bound, contracted has HHA 11 hrs 7 days a week, history of Parkinson disease, Alzheimer dementia, CAD s/p stent (11 years ago) brought in by wife for lethargy for 1 day. Patient was unarousable for a day. As per HHA, he is unable to swallow both solids and liquids, and coughs and gags with feeding. He is not eating for last 3-4 days. Patient is AAO 1 and history obtained from the wife. Patient is dyspneic for last few days which worsens on exertion.    As per wife patient has no chest pain or SOB. No nausea/vomiting/fevers/night sweats. No abdominal pain/diarrhea/constipation. No melena or hematochezia. No dysuria/hematuria. No history of easy bruising/bleeding. No gingival bleeding or epistaxis.    PAST MEDICAL & SURGICAL HISTORY:  BPH (benign prostatic hypertrophy)  Anemia  Seizure  Pneumonia  Achilles rupture  CAD (coronary artery disease)  Alzheimer's dementia  Parkinson's disease  S/P routine circumcision:   Stented coronary artery: x1 ten yrs ago      Allergies: NKA      PHYSICAL EXAM:    T(F): --  HR: 98 (17 @ 10:37) (98 - 98)  BP: 85/57 (- @ 10:37) (85/57 - 85/57)  RR: 26 (17 @ 10:37) (26 - 26)  SpO2: 95% (17 @ 10:37) (95% - 95%)  Wt(kg): --    Daily     Daily     Gen: lethargic  HEENT: Dry oral mucosa, anicteric  Neck: supple, no masses, no JVD  Breasts: deferred  Back: nontender  Cardiovascular: RR, nl S1S2, no murmurs/rubs/gallops  Respiratory: clear air entry b/l  Gastrointestinal: BS+, soft, NT/ND, no masses, no splenomegaly, no hepatomegaly, no evidence for ascites  Extremities: no clubbing/cyanosis, no edema, no calf tenderness  Vascular:  DP/PT 2+ b/l  Neurological: contracted extremities. AAO x 0-1, grimaces to verbal and touch stimuli  Skin: no rash on visible skin  Lymph Nodes:  no cervical/supraclavicular LAD, no axillary/groin LAD  Musculoskeletal:  full ROM      Labs:                          9.1    19.1  )-----------( 305      ( 18 Sep 2017 11:07 )             29.9     CBC Full  -  ( 18 Sep 2017 11:07 )  WBC Count : 19.1 K/uL  Hemoglobin : 9.1 g/dL  Hematocrit : 29.9 %  Platelet Count - Automated : 305 K/uL  Mean Cell Volume : 77.7 fl  Mean Cell Hemoglobin : 23.6 pg  Mean Cell Hemoglobin Concentration : 30.4 gm/dL  Auto Neutrophil # : x  Auto Lymphocyte # : x  Auto Monocyte # : x  Auto Eosinophil # : x  Auto Basophil # : x  Auto Neutrophil % : 65.0 %  Auto Lymphocyte % : 24.0 %  Auto Monocyte % : 6.0 %  Auto Eosinophil % : 2.0 %  Auto Basophil % : 2.0 %            167<HH>  |  137<H>  |  139<H>  ----------------------------<  147<H>  4.9   |  16<L>  |  6.58<H>    Ca    9.5      18 Sep 2017 11:07    TPro  8.1  /  Alb  2.8<L>  /  TBili  0.5  /  DBili  x   /  AST  34  /  ALT  7<L>  /  AlkPhos  75        Urinalysis Basic - ( 18 Sep 2017 11:38 )    Color: Yellow / Appearance: Clear / S.020 / pH: x  Gluc: x / Ketone: Negative  / Bili: Negative / Urobili: Negative   Blood: x / Protein: Negative / Nitrite: Negative   Leuk Esterase: Trace / RBC: x / WBC 0-2 /HPF   Sq Epi: x / Non Sq Epi: Few / Bacteria: Trace /HPF

## 2017-09-18 NOTE — H&P ADULT - PROBLEM SELECTOR PLAN 4
- likely secondary to underlying parkinson's disease and gradual worsening with poor po intake   -palliative consulted -  -F/U WITH NUTRITION CONSULT in am  -f/u with pre -albumin level   -f/u with speech and swallow as risk of aspiration until further recommendations made -sepsis with concern for  Aspiration PNA  -qSOFA 2  -CT chest shows LLL pna  -NPO  -Afebrile, leukocytosis  -Given Zosyn and vanco  x1  -C/w ceftriaxone, azithromycin, and flagyl for now -sepsis with concern for  Aspiration PNA  -qSOFA 2  -CT chest shows LLL pna  -NPO  -Afebrile, tachycardia, leukocytosis  -Given Zosyn and vanco  x1  -C/w ceftriaxone, azithromycin, and flagyl for now

## 2017-09-18 NOTE — H&P ADULT - PROBLEM SELECTOR PLAN 6
- patient wife ALEIDA health care proxy and patient dnr/dni and will c/w comfort measures and palliative care team - likely secondary to underlying parkinson's disease and gradual worsening with poor po intake   -palliative consulted -  -F/U WITH NUTRITION CONSULT in am  -f/u with pre -albumin level   -f/u with speech and swallow as risk of aspiration until further recommendations made

## 2017-09-18 NOTE — H&P ADULT - PROBLEM SELECTOR PLAN 3
-sepsis with concern for  Aspiration PNA  -qSOFA 2  -CT chest shows LLL pna  -NPO  f/u Speech swallow consult  -Afebrile, leukocytosis  -Given Zosyn x1  -C/w ceftriaxone and flagyl for now likely pre -renal causes secondary to poor po intake and also hemodynamically mediated secondary to low blood pressure and concern for sepsis   BUN: Cr >20:1  c/w i.v fluids for now   -s/p pedro [h/o bph and patient stop taking flomax ]  f/U urine lytes and urine osmolality and serum osmolality   -monitor bmp closely   -nephrology consult -

## 2017-09-18 NOTE — CONSULT NOTE ADULT - PROBLEM SELECTOR RECOMMENDATION 4
Palliative informed  Chronic illness with poor prognosis  Albumin 2.8
needs full ADL 24 hr care, has HHA at bedside

## 2017-09-18 NOTE — CONSULT NOTE ADULT - ATTENDING COMMENTS
86 male with hx of CAD, parkinsons disease, dementia, noted to have progressive decline in cognitive function and physical status over past few weeks to months.  Patient lives at home with wife and home health aid.  Presents with hypernatremia, JEREMY, aspiration pneumonia.  Wife doesn't want any aggressive interventions aside from hydration and abx, interested in hospice.  Stable for admission to medical floor.

## 2017-09-18 NOTE — CONSULT NOTE ADULT - PROBLEM SELECTOR RECOMMENDATION 9
Likely 2/2 poor oral intake  Free water deficit 9.6 L  NPO for now because of risk of aspiration  s/p 2 L NS bolus  f/u urine electrolytes q 6-8  C/w 1/2 NS  f/u urine output

## 2017-09-18 NOTE — CONSULT NOTE ADULT - SUBJECTIVE AND OBJECTIVE BOX
Patient is a 86y Male bedbound sec to parkinsons dse, and dementia. Brought to ED with poor oral intake for a month progressively getting worse and cough. In ED found to have elevated BUN//6.68 and hypernatremia of 167. Imaging does not show hydronephrosis but has pneumonia. started on IV antibiotics and started on D5W 1/2 NS 100CC/HR.  SCR and hypernatremia improving.    PAST MEDICAL & SURGICAL HISTORY:  BPH (benign prostatic hypertrophy)  Anemia  Seizure  Pneumonia  Achilles rupture  CAD (coronary artery disease)  Alzheimer's dementia  Parkinson's disease  S/P routine circumcision:   Stented coronary artery: x1 ten yrs ago    No Known Allergies    Home Medications Reviewed  Hospital Medications:   MEDICATIONS  (STANDING):  cefTRIAXone   IVPB 1 Gram(s) IV Intermittent every 24 hours  metroNIDAZOLE  IVPB      heparin  Injectable 5000 Unit(s) SubCutaneous every 12 hours  metroNIDAZOLE  IVPB 500 milliGRAM(s) IV Intermittent every 8 hours  dextrose 5% + sodium chloride 0.45%. 1000 milliLiter(s) (100 mL/Hr) IV Continuous <Continuous>  aspirin  chewable 81 milliGRAM(s) Oral daily  carbidopa/levodopa  25/250 1 Tablet(s) Oral two times a day  azithromycin  IVPB      azithromycin  IVPB 500 milliGRAM(s) IV Intermittent once    SOCIAL HISTORY:  Denies ETOh,Smoking,   FAMILY HISTORY:  Family history of Parkinson's disease (Sibling)        VITALS:  T(F): 97.9 (17 @ 16:08), Max: 97.9 (17 @ 16:08)  HR: 95 (17 @ 16:08)  BP: 95/75 (17 @ 16:08)  RR: 20 (17 @ 16:08)  SpO2: 98% (17 @ 16:08)  Wt(kg): --      Weight (kg): 99.8 ( @ 10:37)  PHYSICAL EXAM:  Constitutional: NAD  HEENT: anicteric sclera, oropharynx clear.  Neck: No JVD  Respiratory: bilst  wheezes, rales   Cardiovascular: S1, S2, RRR  Gastrointestinal: BS+, soft, NT/ND  Extremities: No peripheral edema  Neurological: lethargic , contracted          LABS:      165<HH>  |  136<H>  |  138<H>  ----------------------------<  173<H>  4.5   |  16<L>  |  6.44<H>    Ca    8.8      18 Sep 2017 15:26    TPro  8.1  /  Alb  2.8<L>  /  TBili  0.5  /  DBili      /  AST  34  /  ALT  7<L>  /  AlkPhos  75  09-18    Creatinine Trend: 6.44 <--, 6.58 <--                        9.1    19.1  )-----------( 305      ( 18 Sep 2017 11:07 )             29.9     Urine Studies:  Urinalysis Basic - ( 18 Sep 2017 11:38 )    Color: Yellow / Appearance: Clear / S.020 / pH:   Gluc:  / Ketone: Negative  / Bili: Negative / Urobili: Negative   Blood:  / Protein: Negative / Nitrite: Negative   Leuk Esterase: Trace / RBC:  / WBC 0-2 /HPF   Sq Epi:  / Non Sq Epi: Few / Bacteria: Trace /HPF        RADIOLOGY & ADDITIONAL STUDIES:

## 2017-09-18 NOTE — CHART NOTE - NSCHARTNOTEFT_GEN_A_CORE
patient is evaluated at the bedside for swallow  evaluation and patient was given apple sauce and patient able to swallow without any cough but would be cautious with thin liquids and patient to get official evaluation with speech and swallow and will continue with dysphagia puree diet and no liquids for now

## 2017-09-18 NOTE — CHART NOTE - NSCHARTNOTEFT_GEN_A_CORE
Rapid Response  PGY 3 Note  Patient is a 86y old  Male         admitted for   Rapid response team called because    Patient was seen and examined at the bedside by the rapid response team.    Allergies    No Known Allergies    Intolerances        PAST MEDICAL & SURGICAL HISTORY:  BPH (benign prostatic hypertrophy)  Anemia  Seizure  Pneumonia  Achilles rupture  CAD (coronary artery disease)  Alzheimer's dementia  Parkinson's disease  S/P routine circumcision:   Stented coronary artery: x1 ten yrs ago      Vital Signs Last 24 Hrs  T(C): 36.6 (18 Sep 2017 16:08), Max: 36.6 (18 Sep 2017 16:08)  T(F): 97.9 (18 Sep 2017 16:08), Max: 97.9 (18 Sep 2017 16:08)  HR: 95 (18 Sep 2017 16:08) (95 - 98)  BP: 95/75 (18 Sep 2017 16:08) (85/57 - 95/75)  BP(mean): --  RR: 20 (18 Sep 2017 16:08) (20 - 26)  SpO2: 98% (18 Sep 2017 16:08) (95% - 98%)          GENERAL: The patient is awake and alert in no apparent distress.   HEENT: Head is normocephalic and atraumatic. Extraocular muscles are intact. Mucous membranes are moist. No throat erythema/exudates no lymphadenopathy, no JVD,   NECK: Supple.  LUNGS: Clear to auscultation BL without wheezing, rales or rhonchi; respirations unlabored  HEART: Regular rate and rhythm ,+S1/+S2, no murmurs, rubs, gallops  ABDOMEN: Soft, nontender, and nondistended, no rebound, guarding rigidity, bowel sounds in all 4 quadrants  EXTREMITIES: Without any cyanosis, clubbing, rash, lesions or edema.  SKIN: No new rashes or lesions.  MSK: strength equal BL  VASCULAR: Radial and Dorsal pedal pulses palpable BL  NEUROLOGIC: Grossly intact.  PSYCH: No new changes.                            9.1    19.1  )-----------( 305      ( 18 Sep 2017 11:07 )             29.9     09-18    165<HH>  |  136<H>  |  138<H>  ----------------------------<  173<H>  4.5   |  16<L>  |  6.44<H>    Ca    8.8      18 Sep 2017 15:26    TPro  8.1  /  Alb  2.8<L>  /  TBili  0.5  /  DBili  x   /  AST  34  /  ALT  7<L>  /  AlkPhos  75  09-18         LIVER FUNCTIONS - ( 18 Sep 2017 11:07 )  Alb: 2.8 g/dL / Pro: 8.1 g/dL / ALK PHOS: 75 U/L / ALT: 7 U/L DA / AST: 34 U/L / GGT: x         Urinalysis Basic - ( 18 Sep 2017 11:38 )    Color: Yellow / Appearance: Clear / S.020 / pH: x  Gluc: x / Ketone: Negative  / Bili: Negative / Urobili: Negative   Blood: x / Protein: Negative / Nitrite: Negative   Leuk Esterase: Trace / RBC: x / WBC 0-2 /HPF   Sq Epi: x / Non Sq Epi: Few / Bacteria: Trace /HPF             Vital Signs Last 24 Hrs*       Assessment- Rapid Response called for 86y year old Male with a past medical history of     Plan- Rapid Response  PGY 3 Note  HPI:  86 year old male from home, bed bound, contracted has HHA 11 hrs 7 days a week, history of Parkinson disease, Alzheimer dementia, CAD s/p stent (11 years ago) brought in by home health aid for altered mental status and poor PO intake and lethargy. As per HHA at bedside patient has poor PO intake for last 1 month which has been gradually worsening for last 2 weeks and patient also noted to have cough for last few days and unable to feed because patient HHA complaints of cough and gurgling while trying to feed him. HHA at bed side denies any chest pain, shortness of breath, nausea, vomiting any blood in stools or dysuria or hematuria or any other active complaints ; IN the E.D patient vitals hypotensive 85/57 and HR-98 and RR-26  and pulse oximetry -98 and labs showed a WBC count of 19.1 and lactate -2.9 and elevated BUN AND CREATI -138/6.68 and ICU  was consulted for the patient and ICU evaluated the patient and downgraded the patient after initial evaluation and spoke to patient wife ALEIDA - [phone] {HCP} and she makes the decision for DNR/DNI and palliative care consulted (18 Sep 2017 14:29) pt was started on NS @ 100 c/hr    RRT was called for respiratory distress , pt was desaturation up to 70s and 80s,Patient was seen and examined at the bedside by the rapid response team. Pt had b/l crackles Respiratory distress was most likely due to fluid overload, given pt's JEREMY 80 IV push lasix was given and given pt's poor prognosis morphine was ordered for respiratory distress and work of breathing. plan of care and prognosis was d/w family and attending. Pt is not a candidate of BiPAP at this point and DNR/DNI. As per son Ron and wife Aleida they would like pt to be in comfort and would like to proceed for hospice care. Pt's son to come to hospital in am.       Allergies    No Known Allergies    Intolerances        PAST MEDICAL & SURGICAL HISTORY:  BPH (benign prostatic hypertrophy)  Anemia  Seizure  Pneumonia  Achilles rupture  CAD (coronary artery disease)  Alzheimer's dementia  Parkinson's disease  S/P routine circumcision:   Stented coronary artery: x1 ten yrs ago      Vital Signs Last 24 Hrs  T(C): 36.6 (18 Sep 2017 16:08), Max: 36.6 (18 Sep 2017 16:08)  T(F): 97.9 (18 Sep 2017 16:08), Max: 97.9 (18 Sep 2017 16:08)  HR: 95 (18 Sep 2017 16:08) (95 - 98)  BP: 95/75 (18 Sep 2017 16:08) (85/57 - 95/75)  BP(mean): --  RR: 20 (18 Sep 2017 16:08) (20 - 26)  SpO2: 98% (18 Sep 2017 16:08) (95% - 98%)          GENERAL: The patient lethargic  in severe resp distress.   HEENT: Head is normocephalic and atraumatic. Extraocular muscles are intact. Mucous membranes are moist. No throat erythema/exudates no lymphadenopathy, +JVD,   NECK: Supple.  LUNGS: b/l crackles without wheezing, rales or rhonchi; respirations labored  HEART: Regular rate and rhythm ,+S1/+S2, no murmurs, rubs, gallops  ABDOMEN: Soft, nontender, and nondistended, no rebound, guarding rigidity, bowel sounds in all 4 quadrants  EXTREMITIES: Without any cyanosis, clubbing, rash, lesions or edema.  SKIN: No new rashes or lesions.  MSK: strength equal BL  VASCULAR: Radial and Dorsal pedal pulses palpable BL  NEUROLOGIC: Grossly intact.  PSYCH: No new changes.                            9.1    19.1  )-----------( 305      ( 18 Sep 2017 11:07 )             29.9     09-18    165<HH>  |  136<H>  |  138<H>  ----------------------------<  173<H>  4.5   |  16<L>  |  6.44<H>    Ca    8.8      18 Sep 2017 15:26    TPro  8.1  /  Alb  2.8<L>  /  TBili  0.5  /  DBili  x   /  AST  34  /  ALT  7<L>  /  AlkPhos  75  09-18         LIVER FUNCTIONS - ( 18 Sep 2017 11:07 )  Alb: 2.8 g/dL / Pro: 8.1 g/dL / ALK PHOS: 75 U/L / ALT: 7 U/L DA / AST: 34 U/L / GGT: x         Urinalysis Basic - ( 18 Sep 2017 11:38 )    Color: Yellow / Appearance: Clear / S.020 / pH: x  Gluc: x / Ketone: Negative  / Bili: Negative / Urobili: Negative   Blood: x / Protein: Negative / Nitrite: Negative   Leuk Esterase: Trace / RBC: x / WBC 0-2 /HPF   Sq Epi: x / Non Sq Epi: Few / Bacteria: Trace /HPF             Assessment-     86 year old male from home, bed bound, contracted has HHA 11 hrs 7 days a week, history of Parkinson disease, Alzheimer dementia, CAD s/p stent (11 years ago) brought in by home health aid for altered mental status and poor PO intake and lethargy. As per HHA at bedside patient has poor PO intake for last 1 month which has been gradually worsening for last 2 weeks and patient also noted to have cough for last few days and unable to feed because patient HHA complaints of cough and gurgling while trying to feed him. HHA at bed side denies any chest pain, shortness of breath, nausea, vomiting any blood in stools or dysuria or hematuria or any other active complaints ; IN the E.D patient vitals hypotensive 85/57 and HR-98 and RR-26  and pulse oximetry -98 and labs showed a WBC count of 19.1 and lactate -2.9 and elevated BUN AND CREATI -138/6.68 and ICU  was consulted for the patient and ICU evaluated the patient and downgraded the patient after initial evaluation and spoke to patient wife ALEIDA - [phone] {HCP} and she makes the decision for DNR/DNI and palliative care consulted (18 Sep 2017 14:29) pt was started on NS @ 100 c/hr    RRT was called for respiratory distress , pt was desaturation up to 70s and 80s,Patient was seen and examined at the bedside by the rapid response team. Pt had b/l crackles Respiratory distress was most likely due to fluid overload, given pt's JEREMY 80 IV push lasix was given and given pt's poor prognosis morphine was ordered for respiratory distress and work of breathing. plan of care and prognosis was d/w family and attending. Pt is not a candidate of BiPAP at this point and DNR/DNI. As per son Ron and wife Aleida they would like pt to be in comfort and would like to proceed for hospice care. Pt's son to come to hospital in am.    Plan-    Respiratory distress  2/2 fluid overload , pt was on NS @ 100 cc/hr , which was given for hypernatremia   s/p 80 mg of lasix for JEREMY ~ 6.0  family discussion - pt is DNR/DNI, family wants comfort care  no more blood draws   comfort care   MEWs suspension  Morphin prn for dyspnea     will start gentle hydration at NS @ 60 cc for hypernatremia and JEREMY

## 2017-09-18 NOTE — ED PROVIDER NOTE - MEDICAL DECISION MAKING DETAILS
possible sepsis, will check labs, urine, cxr, also CT head for change in mental status, likely admission

## 2017-09-18 NOTE — ED ADULT TRIAGE NOTE - CHIEF COMPLAINT QUOTE
Decreased level of conciousness with moist breath sounds.  18 gauge to left forearm as per EMS.  Given 200 ml NS

## 2017-09-18 NOTE — H&P ADULT - ATTENDING COMMENTS
Patient seen and examined in ED. Patient's history, vital's, labs, imaging studies reviewed. Discussed with above resident, agree with note with edits. Plan of care discussed with patient, and wife agrees, all questions answered.   Allegra Rodriguez MD  9/18/2017 Patient seen and examined in ED. Patient's history, vitals, labs, imaging studies reviewed. Discussed with above resident, agree with note with edits. Plan of care discussed with patient, and wife agrees, all questions answered.   Allegra Rodriguez MD  9/18/2017

## 2017-09-18 NOTE — H&P ADULT - NSHPPHYSICALEXAM_GEN_ALL_CORE
PHYSICAL EXAM:  GENERAL: NAD, well-groomed, well-developed  HEAD:  Atraumatic, Normocephalic  EYES: EOMI, PERRLA, conjunctiva and sclera clear  NECK: Supple, No JVD, Normal thyroid  CHEST/LUNG: Clear to percussion bilaterally; No rales, rhonchi, wheezing, or rubs  HEART: Regular rate and rhythm; No murmurs, rubs, or gallops  ABDOMEN: Soft, Nontender, Nondistended; Bowel sounds present  NERVOUS SYSTEM:  Alert & Oriented X3, Good concentration; Motor Strength 5/5 B/L   EXTREMITIES:  2+ Peripheral Pulses, No clubbing, cyanosis, or edema  SKIN; PHYSICAL EXAM:  GENERAL: bed bound , no acute distress   HEAD:  Atraumatic, Normocephalic  EYES: EOMI, PERRLA, conjunctiva and sclera clear  NECK: Supple  CHEST/LUNG: rales heard b/l , l>R  HEART: Regular rate and rhythm; No murmurs, rubs, or gallops  ABDOMEN: Soft, Nontender, Nondistended; Bowel sounds present  NERVOUS SYSTEM:  Alert & Oriented X1, spasticity noted in upper extremities   EXTREMITIES:  2+ Peripheral Pulses, No clubbing, cyanosis, or edema  SKIN; pressure ulcer sacral area Vital Signs Last 24 Hrs  T(C): --  T(F): --  HR: 98 (18 Sep 2017 10:37) (98 - 98)  BP: 85/57 (18 Sep 2017 10:37) (85/57 - 85/57)  BP(mean): --  RR: 26 (18 Sep 2017 10:37) (26 - 26)  SpO2: 95% (18 Sep 2017 10:37) (95% - 95%)            PHYSICAL EXAM:  GENERAL: bed bound, no acute distress   HEAD:  Atraumatic, Normocephalic  EYES: EOMI, PERRL, conjunctiva and sclera clear  NECK: Supple  CHEST/LUNG: rales heard b/l , l>R  HEART: Regular rate and rhythm; No murmurs, rubs, or gallops  ABDOMEN: Soft, Nontender, Nondistended; Bowel sounds present  NERVOUS SYSTEM:  Alert & Oriented X1, spasticity noted in upper extremities   EXTREMITIES:  2+ Peripheral Pulses, No clubbing, cyanosis, or edema  SKIN; pressure ulcer sacral area

## 2017-09-18 NOTE — CONSULT NOTE ADULT - PROBLEM SELECTOR RECOMMENDATION 5
Holding sinemet as patient NPO will restart once on diet
spoke with wife at length on the phone - she herself is undergoing chemo due to metastatic breast cancer and becoming weaker. Keeping pt at home even with HHAs might be too much and wants info about LTC placement. Has a home visiting doctor Dr. Nogueira [phone] (left a ).   Interested in hospice but more concerned about LTC placement vs home first.   She is very clear about pt's advance directives - DNR/DNi and no artificial nutrition - "he suffered enough". She will be coming in wednesday to speak with SAVANNAH.

## 2017-09-18 NOTE — ED ADULT NURSE REASSESSMENT NOTE - NS ED NURSE REASSESS COMMENT FT1
pt improved, more alert and oriented. able to speak. recognizes HHA. pending bed availability on medicine floor.

## 2017-09-18 NOTE — ED PROVIDER NOTE - OBJECTIVE STATEMENT
86 M with hx of parkinsons/dementia, brought in by wife for decreased responsiveness, not eating over past few days.  Patient also noted to have cough, no fevers.  Patient normally talking/conversive.

## 2017-09-18 NOTE — H&P ADULT - FAMILY HISTORY
Sibling  Still living? Unknown  Family history of Parkinson's disease, Age at diagnosis: Age Unknown

## 2017-09-18 NOTE — H&P ADULT - PROBLEM SELECTOR PLAN 2
likely pre -renal causes secondary to poor po intake and also hemodynamically mediated secondary to low blood pressure and concern for sepsis   BUN: Cr >20:1  c/w i.v fluids for now   -s/p pedro [ h/o bph and patient stop taking flomax }  f/U urine lytes and urine osmolality and serum osmolality   -monitor bmp closely   -nephrology consult - metabolic encephalopathy likely also secondary to elevated sodium   Free water deficit 9.6 L{ plan to replace free water deficit in 48-72 hours }  NPO for now because of risk of aspiration  will c/w d5 and 1/2 ns @ 100 ml/hr   - f/u serum osmolality and urine osmolality and urine lytes   -monitor urine output  -strict i/o   -monitor BMP CLOSELY q 6 hours   -nephrology -DR. URIAS

## 2017-09-18 NOTE — ED PROVIDER NOTE - PROGRESS NOTE DETAILS
sodium chloride boluses withheld as patient is hypernatremic, will start D5W drip, awaiting MICU eval

## 2017-09-18 NOTE — H&P ADULT - NSHPLABSRESULTS_GEN_ALL_CORE
Vital Signs Last 24 Hrs  T(C): --  T(F): --  HR: 98 (18 Sep 2017 10:37) (98 - 98)  BP: 85/57 (18 Sep 2017 10:37) (85/57 - 85/57)  BP(mean): --  RR: 26 (18 Sep 2017 10:37) (26 - 26)  SpO2: 95% (18 Sep 2017 10:37) (95% - 95%)        LABS:                        9.1    19.1  )-----------( 305      ( 18 Sep 2017 11:07 )             29.9         167<HH>  |  137<H>  |  139<H>  ----------------------------<  147<H>  4.9   |  16<L>  |  6.58<H>    Ca    9.5      18 Sep 2017 11:07    TPro  8.1  /  Alb  2.8<L>  /  TBili  0.5  /  DBili  x   /  AST  34  /  ALT  7<L>  /  AlkPhos  75  -18      Urinalysis Basic - ( 18 Sep 2017 11:38 )    Color: Yellow / Appearance: Clear / S.020 / pH: x  Gluc: x / Ketone: Negative  / Bili: Negative / Urobili: Negative   Blood: x / Protein: Negative / Nitrite: Negative   Leuk Esterase: Trace / RBC: x / WBC 0-2 /HPF   Sq Epi: x / Non Sq Epi: Few / Bacteria: Trace /HPF      CAPILLARY BLOOD GLUCOSE          RADIOLOGY & ADDITIONAL TESTS:    Imaging Personally Reviewed:  CT CHEST :Left lower lobe consolidation and left upper lobe nodular   groundglass opacities. Findings are consistent with infection.  No pleural effusion. LABS:                        9.1    19.1  )-----------( 305      ( 18 Sep 2017 11:07 )             29.9         167<HH>  |  137<H>  |  139<H>  ----------------------------<  147<H>  4.9   |  16<L>  |  6.58<H>    Ca    9.5      18 Sep 2017 11:07    TPro  8.1  /  Alb  2.8<L>  /  TBili  0.5  /  DBili  x   /  AST  34  /  ALT  7<L>  /  AlkPhos  75        Urinalysis Basic - ( 18 Sep 2017 11:38 )    Color: Yellow / Appearance: Clear / S.020 / pH: x  Gluc: x / Ketone: Negative  / Bili: Negative / Urobili: Negative   Blood: x / Protein: Negative / Nitrite: Negative   Leuk Esterase: Trace / RBC: x / WBC 0-2 /HPF   Sq Epi: x / Non Sq Epi: Few / Bacteria: Trace /HPF      CAPILLARY BLOOD GLUCOSE          RADIOLOGY & ADDITIONAL TESTS:    Imaging Personally Reviewed:  CT CHEST :Left lower lobe consolidation and left upper lobe nodular   groundglass opacities. Findings are consistent with infection.  No pleural effusion.

## 2017-09-18 NOTE — ED ADULT NURSE NOTE - ED STAT RN HANDOFF DETAILS
endorsed to RUDY Shi in G1 in stable condition for continuation of care. pt a&ox1-2, hx of dementia. stage 2 sacral ulcer and stage 2 right buttock. 20G right arm and 18G right arm. admitted for hypernatremia and pleural effusion. 16F pedro in place.

## 2017-09-19 ENCOUNTER — TRANSCRIPTION ENCOUNTER (OUTPATIENT)
Age: 82
End: 2017-09-19

## 2017-09-19 DIAGNOSIS — R06.00 DYSPNEA, UNSPECIFIED: ICD-10-CM

## 2017-09-19 DIAGNOSIS — N17.9 ACUTE KIDNEY FAILURE, UNSPECIFIED: ICD-10-CM

## 2017-09-19 LAB
ALBUMIN SERPL ELPH-MCNC: 2.2 G/DL — LOW (ref 3.5–5)
ALP SERPL-CCNC: 60 U/L — SIGNIFICANT CHANGE UP (ref 40–120)
ALT FLD-CCNC: 8 U/L DA — LOW (ref 10–60)
ANION GAP SERPL CALC-SCNC: 14 MMOL/L — SIGNIFICANT CHANGE UP (ref 5–17)
AST SERPL-CCNC: 63 U/L — HIGH (ref 10–40)
BILIRUB DIRECT SERPL-MCNC: 0.2 MG/DL — SIGNIFICANT CHANGE UP (ref 0–0.2)
BILIRUB INDIRECT FLD-MCNC: 0.3 MG/DL — SIGNIFICANT CHANGE UP (ref 0.2–1)
BILIRUB SERPL-MCNC: 0.5 MG/DL — SIGNIFICANT CHANGE UP (ref 0.2–1.2)
BUN SERPL-MCNC: 137 MG/DL — HIGH (ref 7–18)
CALCIUM SERPL-MCNC: 8.8 MG/DL — SIGNIFICANT CHANGE UP (ref 8.4–10.5)
CHLORIDE SERPL-SCNC: 136 MMOL/L — HIGH (ref 96–108)
CHOLEST SERPL-MCNC: 131 MG/DL — SIGNIFICANT CHANGE UP (ref 10–199)
CO2 SERPL-SCNC: 16 MMOL/L — LOW (ref 22–31)
CREAT SERPL-MCNC: 6.8 MG/DL — HIGH (ref 0.5–1.3)
CULTURE RESULTS: NO GROWTH — SIGNIFICANT CHANGE UP
GLUCOSE SERPL-MCNC: 162 MG/DL — HIGH (ref 70–99)
HCT VFR BLD CALC: 27.2 % — LOW (ref 39–50)
HDLC SERPL-MCNC: 20 MG/DL — LOW (ref 40–125)
HGB BLD-MCNC: 8 G/DL — LOW (ref 13–17)
LIPID PNL WITH DIRECT LDL SERPL: 71 MG/DL — SIGNIFICANT CHANGE UP
MAGNESIUM SERPL-MCNC: 3.1 MG/DL — HIGH (ref 1.6–2.6)
MCHC RBC-ENTMCNC: 22.7 PG — LOW (ref 27–34)
MCHC RBC-ENTMCNC: 29.4 GM/DL — LOW (ref 32–36)
MCV RBC AUTO: 77.1 FL — LOW (ref 80–100)
PHOSPHATE SERPL-MCNC: 5.9 MG/DL — HIGH (ref 2.5–4.5)
PLATELET # BLD AUTO: 259 K/UL — SIGNIFICANT CHANGE UP (ref 150–400)
POTASSIUM SERPL-MCNC: 4.3 MMOL/L — SIGNIFICANT CHANGE UP (ref 3.5–5.3)
POTASSIUM SERPL-SCNC: 4.3 MMOL/L — SIGNIFICANT CHANGE UP (ref 3.5–5.3)
PREALB SERPL-MCNC: 13 MG/DL — LOW (ref 18–45)
PROT SERPL-MCNC: 7.1 G/DL — SIGNIFICANT CHANGE UP (ref 6–8.3)
RBC # BLD: 3.53 M/UL — LOW (ref 4.2–5.8)
RBC # FLD: 17.4 % — HIGH (ref 10.3–14.5)
SODIUM SERPL-SCNC: 166 MMOL/L — CRITICAL HIGH (ref 135–145)
SPECIMEN SOURCE: SIGNIFICANT CHANGE UP
TOTAL CHOLESTEROL/HDL RATIO MEASUREMENT: 6.6 RATIO — SIGNIFICANT CHANGE UP (ref 3.4–9.6)
TRIGL SERPL-MCNC: 200 MG/DL — HIGH (ref 10–149)
TSH SERPL-MCNC: 1.73 UU/ML — SIGNIFICANT CHANGE UP (ref 0.34–4.82)
VIT B12 SERPL-MCNC: 292 PG/ML — SIGNIFICANT CHANGE UP (ref 243–894)
WBC # BLD: 13.6 K/UL — HIGH (ref 3.8–10.5)
WBC # FLD AUTO: 13.6 K/UL — HIGH (ref 3.8–10.5)

## 2017-09-19 PROCEDURE — 99233 SBSQ HOSP IP/OBS HIGH 50: CPT

## 2017-09-19 RX ORDER — CEFTRIAXONE 500 MG/1
1 INJECTION, POWDER, FOR SOLUTION INTRAMUSCULAR; INTRAVENOUS
Qty: 0 | Refills: 0 | COMMUNITY
Start: 2017-09-19

## 2017-09-19 RX ORDER — MEGESTROL ACETATE 40 MG/ML
5 SUSPENSION ORAL
Qty: 0 | Refills: 0 | COMMUNITY

## 2017-09-19 RX ORDER — METRONIDAZOLE 500 MG
1 TABLET ORAL
Qty: 0 | Refills: 0 | COMMUNITY
Start: 2017-09-19

## 2017-09-19 RX ORDER — CETIRIZINE HYDROCHLORIDE 10 MG/1
0 TABLET ORAL
Qty: 0 | Refills: 0 | COMMUNITY

## 2017-09-19 RX ORDER — AZITHROMYCIN 500 MG/1
500 TABLET, FILM COATED ORAL
Qty: 0 | Refills: 0 | COMMUNITY
Start: 2017-09-19

## 2017-09-19 RX ORDER — HYDROMORPHONE HYDROCHLORIDE 2 MG/ML
0.5 INJECTION INTRAMUSCULAR; INTRAVENOUS; SUBCUTANEOUS
Qty: 0 | Refills: 0 | Status: DISCONTINUED | OUTPATIENT
Start: 2017-09-19 | End: 2017-09-20

## 2017-09-19 RX ORDER — ROBINUL 0.2 MG/ML
0.4 INJECTION INTRAMUSCULAR; INTRAVENOUS EVERY 8 HOURS
Qty: 0 | Refills: 0 | Status: DISCONTINUED | OUTPATIENT
Start: 2017-09-19 | End: 2017-09-20

## 2017-09-19 RX ORDER — HYDROMORPHONE HYDROCHLORIDE 2 MG/ML
0.5 INJECTION INTRAMUSCULAR; INTRAVENOUS; SUBCUTANEOUS
Qty: 0 | Refills: 0 | COMMUNITY
Start: 2017-09-19

## 2017-09-19 RX ADMIN — Medication 100 MILLIGRAM(S): at 14:07

## 2017-09-19 RX ADMIN — HEPARIN SODIUM 5000 UNIT(S): 5000 INJECTION INTRAVENOUS; SUBCUTANEOUS at 06:09

## 2017-09-19 RX ADMIN — AZITHROMYCIN 250 MILLIGRAM(S): 500 TABLET, FILM COATED ORAL at 21:12

## 2017-09-19 RX ADMIN — Medication 100 MILLIGRAM(S): at 21:12

## 2017-09-19 RX ADMIN — SODIUM CHLORIDE 60 MILLILITER(S): 9 INJECTION, SOLUTION INTRAVENOUS at 06:09

## 2017-09-19 RX ADMIN — Medication 100 MILLIGRAM(S): at 06:09

## 2017-09-19 RX ADMIN — CEFTRIAXONE 100 GRAM(S): 500 INJECTION, POWDER, FOR SOLUTION INTRAMUSCULAR; INTRAVENOUS at 06:08

## 2017-09-19 RX ADMIN — ROBINUL 0.4 MILLIGRAM(S): 0.2 INJECTION INTRAMUSCULAR; INTRAVENOUS at 22:00

## 2017-09-19 RX ADMIN — MORPHINE SULFATE 1 MILLIGRAM(S): 50 CAPSULE, EXTENDED RELEASE ORAL at 06:54

## 2017-09-19 RX ADMIN — MORPHINE SULFATE 1 MILLIGRAM(S): 50 CAPSULE, EXTENDED RELEASE ORAL at 06:57

## 2017-09-19 NOTE — DISCHARGE NOTE ADULT - PLAN OF CARE
correction of sodium fluids as tolerated, no more fingersticks as per family high sodium levels  correction with fluids as tolerated no more fingersticks continue with antibiotics nutrition as per family and hospice care continue with parkinson's medication

## 2017-09-19 NOTE — PROGRESS NOTE ADULT - PROBLEM SELECTOR PLAN 5
comfort care and feeding as per family request
spoke with son at length. Pt's wife is currently getting chemo tx at [facility] but both agree with plan for inpt hospice referral and comfort care only. supportive care given.

## 2017-09-19 NOTE — CONSULT NOTE ADULT - SUBJECTIVE AND OBJECTIVE BOX
HPI:  86 year old male from home, bed bound, contracted has HHA 11 hrs 7 days a week, history of Parkinson disease, Alzheimer dementia, CAD s/p stent (11 years ago) brought in by home health aid for altered mental status and poor PO intake and lethargy. As per HHA at bedside patient has poor PO intake for last 1 month which has been gradually worsening for last 2 weeks and patient also noted to have cough for last few days and unable to feed because patient HHA complaints of cough and gurgling while trying to feed him. HHA at bed side denies any chest pain, shortness of breath, nausea, vomiting any blood in stools or dysuria or hematuria or any other active complaints .  IN the E.D patient vitals hypotensive 85/57 and HR-98 and RR-26  and pulse oximetry -98 and labs showed a WBC count of 19.1 and lactate -2.9 and elevated BUN AND CREATI -138/6.68 and ICU  was consulted for the patient and ICU evaluated the patient and downgraded the patient after initial evaluation and spoke to patient wife ALEIDA - [phone] HCP and she makes the decision for DNR/DNI and palliative care consulted (18 Sep 2017 14:29)      PAST MEDICAL & SURGICAL HISTORY:  BPH (benign prostatic hypertrophy)  Anemia  Seizure  Pneumonia  Achilles rupture  CAD (coronary artery disease)  Alzheimer's dementia  Parkinson's disease  S/P routine circumcision:   Stented coronary artery: x1 ten yrs ago      No Known Allergies      Meds:  cefTRIAXone   IVPB 1 Gram(s) IV Intermittent every 24 hours  metroNIDAZOLE  IVPB      metroNIDAZOLE  IVPB 500 milliGRAM(s) IV Intermittent every 8 hours  azithromycin  IVPB      azithromycin  IVPB 500 milliGRAM(s) IV Intermittent every 24 hours  HYDROmorphone  Injectable 0.5 milliGRAM(s) IV Push every 1 hour PRN      SOCIAL HISTORY:  Smoker:  YES / NO        PACK YEARS:                         WHEN QUIT?  ETOH use:  YES / NO               FREQUENCY / QUANTITY:  Ilicit Drug use:  YES / NO  Occupation:  Assisted device use (Cane / Walker):  Live with:    FAMILY HISTORY:  Family history of Parkinson's disease (Sibling)      VITALS:  Vital Signs Last 24 Hrs  T(C): 37.1 (19 Sep 2017 15:56), Max: 37.3 (19 Sep 2017 08:23)  T(F): 98.8 (19 Sep 2017 15:56), Max: 99.1 (19 Sep 2017 08:23)  HR: 88 (19 Sep 2017 15:56) (41 - 89)  BP: 81/47 (19 Sep 2017 15:56) (74/45 - 96/87)  BP(mean): --  RR: 16 (19 Sep 2017 15:56) (16 - 32)  SpO2: 97% (19 Sep 2017 15:56) (85% - 98%)    LABS/DIAGNOSTIC TESTS:                          8.0    13.6  )-----------( 259      ( 19 Sep 2017 09:04 )             27.2     WBC Count: 13.6 K/uL ( @ 09:04)  WBC Count: 19.1 K/uL ( @ 11:07)          166<HH>  |  136<H>  |  137<H>  ----------------------------<  162<H>  4.3   |  16<L>  |  6.80<H>    Ca    8.8      19 Sep 2017 09:04  Phos  5.9       Mg     3.1         TPro  7.1  /  Alb  2.2<L>  /  TBili  0.5  /  DBili  0.2  /  AST  63<H>  /  ALT  8<L>  /  AlkPhos  60        Urinalysis Basic - ( 18 Sep 2017 11:38 )    Color: Yellow / Appearance: Clear / S.020 / pH: x  Gluc: x / Ketone: Negative  / Bili: Negative / Urobili: Negative   Blood: x / Protein: Negative / Nitrite: Negative   Leuk Esterase: Trace / RBC: x / WBC 0-2 /HPF   Sq Epi: x / Non Sq Epi: Few / Bacteria: Trace /HPF        LIVER FUNCTIONS - ( 19 Sep 2017 09:04 )  Alb: 2.2 g/dL / Pro: 7.1 g/dL / ALK PHOS: 60 U/L / ALT: 8 U/L DA / AST: 63 U/L / GGT: x                 LACTATE:    ABG -     CULTURES: Culture - Urine (17 @ 18:01)    Specimen Source: .Urine Catheterized    Culture Results:   No growth    Culture - Blood (17 @ 14:36)    Specimen Source: .Blood Blood-Venous    Culture Results:   No growth to date.    Culture - Blood (17 @ 14:36)    Specimen Source: .Blood Blood-Peripheral    Culture Results:   No growth to date.          RADIOLOGY:< from: CT Chest No Cont (17 @ 12:20) >  EXAM:  CT CHEST                              PROCEDURE DATE:  2017          INTERPRETATION:  CLINICAL INFORMATION: Pleural effusion.    COMPARISON: CT chest 10/3/2014. Chest radiograph 2017.    PROCEDURE:   CT of the Chest was performed without intravenous contrast.  Sagittal and coronal reformats were performed.      FINDINGS:    CHEST:     LUNGS AND LARGE AIRWAYS: Debris in the trachea and left mainstem   bronchus. Left lower lobe consolidation. Additional left upper lobe   nodular and groundglass opacities.  PLEURA: No pleural effusion.  VESSELS: Stable enlarged ascending thoracic aorta measuring 4.0 cm.  HEART: Heart size is normal. No pericardial effusion.  MEDIASTINUM AND STEF: Stable subcentimeter paratracheal lymph nodes.  CHEST WALL AND LOWER NECK: Within normal limits.  VISUALIZED UPPER ABDOMEN: Unchanged elevated left hemidiaphragm.   Bilateral renal cysts.  BONES: Degenerative changes.    IMPRESSION: Left lower lobe consolidation and left upper lobe nodular   groundglass opacities. Findings are consistent with infection.  No pleural effusion.        < from: Xray Chest 1 View AP-PORTABLE IMMEDIATE (17 @ 23:06) >  EXAM:  CHEST-PORTABLE STAT                            PROCEDURE DATE:  2017          INTERPRETATION:  CLINICAL STATEMENT: Follow-up chest pain.    TECHNIQUE: AP view of the chest.    COMPARISON: 2017 at 1114    FINDINGS/  IMPRESSION:  New small left pleural effusion with persistent mild airspace opacity   left lung base.    Questionable small nodular density overlies right midlung zone.    Heart size cannot be accurately assessed in this projection.      < end of copied text >            ROS  [  ] UNABLE TO ELICIT HPI:  86 year old male from home, bed bound, contracted has HHA 11 hrs 7 days a week, history of Parkinson disease, Alzheimer dementia, CAD s/p stent (11 years ago) brought in by home health aid for altered mental status and poor PO intake and lethargy. As per HHA at bedside patient has poor PO intake for last 1 month which has been gradually worsening for last 2 weeks and patient also noted to have cough for last few days and unable to feed because patient HHA complaints of cough and gurgling while trying to feed him. currently no one is at his bedside and the pt is non verbal, in fact he is not even arousable, he grimaces to noxious stimuli. He was hypotensive in the ER and met sepsis criteria. He was found to have pneumonia of his left lower and upper lobes on CT chest.      PAST MEDICAL & SURGICAL HISTORY:  BPH (benign prostatic hypertrophy)  Anemia  Seizure  Pneumonia  Achilles rupture  CAD (coronary artery disease) s/p stents  Alzheimer's dementia  Parkinson's disease  S/P  circumcision:         No Known Allergies      Meds:  cefTRIAXone   IVPB 1 Gram(s) IV Intermittent every 24 hours  metroNIDAZOLE  IVPB      metroNIDAZOLE  IVPB 500 milliGRAM(s) IV Intermittent every 8 hours  azithromycin  IVPB      azithromycin  IVPB 500 milliGRAM(s) IV Intermittent every 24 hours  HYDROmorphone  Injectable 0.5 milliGRAM(s) IV Push every 1 hour PRN      SOCIAL HISTORY:  Smoker:  unknown  ETOH use: unknown    FAMILY HISTORY:  Family history of Parkinson's disease (Sibling)      VITALS:  Vital Signs Last 24 Hrs  T(C): 37.1 (19 Sep 2017 15:56), Max: 37.3 (19 Sep 2017 08:23)  T(F): 98.8 (19 Sep 2017 15:56), Max: 99.1 (19 Sep 2017 08:23)  HR: 88 (19 Sep 2017 15:56) (41 - 89)  BP: 81/47 (19 Sep 2017 15:56) (74/45 - 96/87)  BP(mean): --  RR: 16 (19 Sep 2017 15:56) (16 - 32)  SpO2: 97% (19 Sep 2017 15:56) (85% - 98%)    LABS/DIAGNOSTIC TESTS:                          8.0    13.6  )-----------( 259      ( 19 Sep 2017 09:04 )             27.2     WBC Count: 13.6 K/uL ( @ 09:04)  WBC Count: 19.1 K/uL ( @ 11:07)          166<HH>  |  136<H>  |  137<H>  ----------------------------<  162<H>  4.3   |  16<L>  |  6.80<H>    Ca    8.8      19 Sep 2017 09:04  Phos  5.9       Mg     3.1         TPro  7.1  /  Alb  2.2<L>  /  TBili  0.5  /  DBili  0.2  /  AST  63<H>  /  ALT  8<L>  /  AlkPhos  60        Urinalysis Basic - ( 18 Sep 2017 11:38 )    Color: Yellow / Appearance: Clear / S.020 / pH: x  Gluc: x / Ketone: Negative  / Bili: Negative / Urobili: Negative   Blood: x / Protein: Negative / Nitrite: Negative   Leuk Esterase: Trace / RBC: x / WBC 0-2 /HPF   Sq Epi: x / Non Sq Epi: Few / Bacteria: Trace /HPF        LIVER FUNCTIONS - ( 19 Sep 2017 09:04 )  Alb: 2.2 g/dL / Pro: 7.1 g/dL / ALK PHOS: 60 U/L / ALT: 8 U/L DA / AST: 63 U/L / GGT: x                 LACTATE:    ABG -     CULTURES: Culture - Urine (17 @ 18:01)    Specimen Source: .Urine Catheterized    Culture Results:   No growth    Culture - Blood (17 @ 14:36)    Specimen Source: .Blood Blood-Venous    Culture Results:   No growth to date.    Culture - Blood (17 @ 14:36)    Specimen Source: .Blood Blood-Peripheral    Culture Results:   No growth to date.          RADIOLOGY:< from: CT Chest No Cont (17 @ 12:20) >  EXAM:  CT CHEST                              PROCEDURE DATE:  2017          INTERPRETATION:  CLINICAL INFORMATION: Pleural effusion.    COMPARISON: CT chest 10/3/2014. Chest radiograph 2017.    PROCEDURE:   CT of the Chest was performed without intravenous contrast.  Sagittal and coronal reformats were performed.      FINDINGS:    CHEST:     LUNGS AND LARGE AIRWAYS: Debris in the trachea and left mainstem   bronchus. Left lower lobe consolidation. Additional left upper lobe   nodular and groundglass opacities.  PLEURA: No pleural effusion.  VESSELS: Stable enlarged ascending thoracic aorta measuring 4.0 cm.  HEART: Heart size is normal. No pericardial effusion.  MEDIASTINUM AND STEF: Stable subcentimeter paratracheal lymph nodes.  CHEST WALL AND LOWER NECK: Within normal limits.  VISUALIZED UPPER ABDOMEN: Unchanged elevated left hemidiaphragm.   Bilateral renal cysts.  BONES: Degenerative changes.    IMPRESSION: Left lower lobe consolidation and left upper lobe nodular   groundglass opacities. Findings are consistent with infection.  No pleural effusion.        < from: Xray Chest 1 View AP-PORTABLE IMMEDIATE (17 @ 23:06) >  EXAM:  CHEST-PORTABLE STAT                            PROCEDURE DATE:  2017          INTERPRETATION:  CLINICAL STATEMENT: Follow-up chest pain.    TECHNIQUE: AP view of the chest.    COMPARISON: 2017 at 1114    FINDINGS/  IMPRESSION:  New small left pleural effusion with persistent mild airspace opacity   left lung base.    Questionable small nodular density overlies right midlung zone.    Heart size cannot be accurately assessed in this projection.      < end of copied text >            ROS  [x  ] UNABLE TO ELICIT

## 2017-09-19 NOTE — PROGRESS NOTE ADULT - PROBLEM SELECTOR PLAN 1
Likely 2/2 poor oral intake and severe dehydration and metabolic encephalopathy likely also secondary to elevated sodium   Free water deficit 9.6 L{ plan to replace free water deficit in 48-72 hours }  NPO for now because of risk of aspiration  d/c fluids due to overnight rapid with apparent fluid overload   - no fingersticks, per family's wishes of comfort care, pt is for inpatient hospice Likely 2/2 poor oral intake and severe dehydration and metabolic encephalopathy likely also secondary to elevated sodium   Free water deficit 9.6 L{ plan to replace free water deficit in 48-72 hours }  On pureed diet now because of risk of aspiration  d/c fluids due to overnight rapid with apparent fluid overload   - no fingersticks, per family's wishes of comfort care, pt for inpatient hospice

## 2017-09-19 NOTE — DISCHARGE NOTE ADULT - OTHER SIGNIFICANT FINDINGS
Medicine attending note:  Patient seen and examined, wife and son at bedside, all questions answered. Patient for discharge to inpatient hospice today.   Allegra Rodriguez MD  9/20/2017 Medicine attending note:  Patient seen and examined, wife and son at bedside, all questions answered. Patient for discharge to inpatient hospice today.   Allegra Rodriguez MD  Time spent > 40 minutes  9/20/2017

## 2017-09-19 NOTE — PROGRESS NOTE ADULT - ASSESSMENT
86 yr old male with dementia, parkinson's disease  admitted with AMS and JEREMY. Overnight hypoxic and worsening of overall clinical condition. in Hospice care. Will sign off   Thank you

## 2017-09-19 NOTE — PROGRESS NOTE ADULT - ASSESSMENT
85 yo with PMH of alzheimer's disease, parkinson's disease, CAD s/p stent 11 years ago  presented with lethargy and poor PO intake will admit the patient for hypernatremia secondary to poor po intake, sepsis due to PNA, JEREMY, and failure to thrive in adult 86 year old male with PMH of alzheimer's disease, parkinson's disease, CAD s/p stent 11 years ago  presented with lethargy and poor PO intake will admit the patient for hypernatremia secondary to poor po intake, sepsis due to PNA, JEREMY, and failure to thrive in adult

## 2017-09-19 NOTE — PROGRESS NOTE ADULT - PROBLEM SELECTOR PLAN 3
likely pre -renal causes secondary to poor po intake and also hemodynamically mediated secondary to low blood pressure and concern for sepsis   BUN: Cr >20:1  c/w i.v fluids for now   -s/p pedro [h/o bph and patient stop taking flomax ]  nephrology signed off due to pt for hospice care  Comfort care as per family likely pre-renal causes secondary to poor po intake and also hemodynamically mediated secondary to low blood pressure and concern for sepsis   BUN: Cr >20:1  c/w i.v fluids for now   -s/p pedro [h/o bph and patient stop taking flomax ]  nephrology signed off as pt for hospice care  Comfort care as per family

## 2017-09-19 NOTE — DISCHARGE NOTE ADULT - SECONDARY DIAGNOSIS.
Metabolic encephalopathy JEREMY (acute kidney injury) Sepsis due to pneumonia Dysphagia FTT (failure to thrive) in adult Parkinson disease

## 2017-09-19 NOTE — PROGRESS NOTE ADULT - PROBLEM SELECTOR PLAN 1
due to fluid overload from IVF, pneumonia worsening renal failure, hypernatremia, d/c IVF, oral hygiene care, spoke with son on phone who confirmed again comfort care measures - DNR/DNI, dilaudid .5mg IVP PRN, d/c unnec meds, eligible now for in hospice. can continue IV abx for now.

## 2017-09-19 NOTE — PROGRESS NOTE ADULT - PROBLEM SELECTOR PLAN 2
metabolic encephalopathy likely also secondary to elevated sodium   Free water deficit 9.6 L{ plan to replace free water deficit in 48-72 hours }  NPO for now because of risk of aspiration  d/c fluids due to overload last night  pt family would like comfort care, no lab draws as per family wishes metabolic encephalopathy likely also secondary to elevated sodium   Free water deficit 9.6 L{ plan to replace free water deficit in 48-72 hours }  d/c fluids due to overload last night  pt family would like comfort care, no lab draws as per family wishes

## 2017-09-19 NOTE — CONSULT NOTE ADULT - ASSESSMENT
86 M h/o Parkinson, dementia brought in for lethargy and poor appetite.
86 yr old male with parkinson's dse and parkinson's dse . Has severe JEREMY and hypernatremia sec to a pre-renal state from PNA. no hydronephrosis.
pneumonia - likely aspiration  sepsis  leukocytosis - improving  plan - cont rocephin 1 gm iv q24hrs  cont flagyl 500mgs iv q8hrs  dc azithromycin  pt is awaiting hospice placement  prognosis is very poor

## 2017-09-19 NOTE — ADVANCED PRACTICE NURSE CONSULT - RECOMMEDATIONS
-Clean all affected areas with warm water, mild soap, pat dry, and apply multiple layers of skin prep to the surrounding skin  -Leave the Bilateral Heel areas open to air  -Apply a Foam dressing to the Bilateral Sacral areas Q 72hrs PRN  -Apply TRIAD Moisture Barrier Cream to the Perianal, Lower Bilateral Gluteus, and Groin Areas b.id. PRN  -Please offer frequent toileting to assist with moisture management and wound healing  -Elevate/float the patients heels using heel protectors and reposition the patient Q 2hrs using wedges or pillows -Clean all affected areas with warm water, mild soap, pat dry, and apply multiple layers of skin prep to the surrounding skin  -Leave the Bilateral Heel areas open to air  -Apply a Foam dressing to the Bilateral Sacral and L. Trochanter  areas Q 72hrs PRN  -Apply TRIAD Moisture Barrier Cream to the Perianal, Lower Bilateral Gluteus, and Groin Areas b.id. PRN  -Please offer frequent toileting to assist with moisture management and wound healing  -Elevate/float the patients heels using heel protectors and reposition the patient Q 2hrs using wedges or pillows

## 2017-09-19 NOTE — PROGRESS NOTE ADULT - SUBJECTIVE AND OBJECTIVE BOX
Patient is a 86y Male with JEREMY. Events noted, pt was hypoxic last night with pulmonary edema. Family agreed for placement to hospice care.    No Known Allergies    Hospital Medications:   MEDICATIONS  (STANDING):  cefTRIAXone   IVPB 1 Gram(s) IV Intermittent every 24 hours  metroNIDAZOLE  IVPB      metroNIDAZOLE  IVPB 500 milliGRAM(s) IV Intermittent every 8 hours  azithromycin  IVPB      azithromycin  IVPB 500 milliGRAM(s) IV Intermittent every 24 hours      VITALS:  T(F): 99.1 (17 @ 08:23), Max: 99.1 (17 @ 08:23)  HR: 80 (17 @ 08:23)  BP: 74/45 (17 @ 08:23)  RR: 18 (17 @ 08:23)  SpO2: 98% (17 @ 08:23)  Wt(kg): --     @ 07:01  -   @ 07:00  --------------------------------------------------------  IN: 700 mL / OUT: 900 mL / NET: -200 mL        PHYSICAL EXAM:  Constitutional: NAD  HEENT: anicteric sclera, oropharynx clear.  Neck: No JVD  Respiratory: CTAB, no wheezes, rales or rhonchi  Cardiovascular: S1, S2, RRR  Gastrointestinal: BS+, soft, NT/ND  Extremities: No cyanosis or clubbing. No peripheral edema  Neurological: Confused, lethargic.   :   pedro+       LABS:      166<HH>  |  136<H>  |  137<H>  ----------------------------<  162<H>  4.3   |  16<L>  |  6.80<H>    Ca    8.8      19 Sep 2017 09:04  Phos  5.9       Mg     3.1         TPro  7.1  /  Alb  2.2<L>  /  TBili  0.5  /  DBili  0.2  /  AST  63<H>  /  ALT  8<L>  /  AlkPhos  60      Creatinine Trend: 6.80 <--, 6.44 <--, 6.58 <--                        8.0    13.6  )-----------( 259      ( 19 Sep 2017 09:04 )             27.2     Urine Studies:  Urinalysis Basic - ( 18 Sep 2017 11:38 )    Color: Yellow / Appearance: Clear / S.020 / pH:   Gluc:  / Ketone: Negative  / Bili: Negative / Urobili: Negative   Blood:  / Protein: Negative / Nitrite: Negative   Leuk Esterase: Trace / RBC:  / WBC 0-2 /HPF   Sq Epi:  / Non Sq Epi: Few / Bacteria: Trace /HPF        RADIOLOGY & ADDITIONAL STUDIES:

## 2017-09-19 NOTE — DISCHARGE NOTE ADULT - PATIENT PORTAL LINK FT
“You can access the [facility] Patient Portal, offered by [facility], by registering with the following website: [email or web address]”

## 2017-09-19 NOTE — DISCHARGE NOTE ADULT - CARE PLAN
Principal Discharge DX:	Hypernatremia  Goal:	correction of sodium  Instructions for follow-up, activity and diet:	fluids as tolerated, no more fingersticks as per family  Secondary Diagnosis:	Metabolic encephalopathy  Instructions for follow-up, activity and diet:	high sodium levels  correction with fluids as tolerated  Secondary Diagnosis:	JEREMY (acute kidney injury)  Instructions for follow-up, activity and diet:	no more fingersticks  Secondary Diagnosis:	Sepsis due to pneumonia  Instructions for follow-up, activity and diet:	continue with antibiotics  Secondary Diagnosis:	Dysphagia  Instructions for follow-up, activity and diet:	nutrition as per family and hospice care  Secondary Diagnosis:	FTT (failure to thrive) in adult  Instructions for follow-up, activity and diet:	nutrition as per family and hospice care  Secondary Diagnosis:	Parkinson disease  Instructions for follow-up, activity and diet:	continue with parkinson's medication

## 2017-09-19 NOTE — PROGRESS NOTE ADULT - PROBLEM SELECTOR PLAN 8
- patient wife ALEIDA health care proxy and patient dnr/dni and will c/w comfort measures and palliative care team - patient wife ALEIDA health care proxy and patient dnr/dni and will c/w comfort measures and palliative care team  pain management - switched to dilaudid 0.5 mg q1hr PRN pain as per palliative recommendations

## 2017-09-19 NOTE — ADVANCED PRACTICE NURSE CONSULT - ASSESSMENT
This is a 86yr old male patient admitted for Hypernatremia, presenting with the following:  -There is evidence of blanchable erythema to the L. Heel  -There is an intact DTI to the R. Heel (5cm x 5cm) without drainage  -There is evidence of Moisture Associated Skin Damage to the Perianal, Lower Bilateral Gluteus, and Groin Areas  -There is a DTI to the Bilateral Sacral area (10cm x 16cm); with epidermal blistering, separation, red tissue, and drainage present This is a 86yr old male patient admitted for Hypernatremia, presenting with the following:  -There is evidence of blanchable erythema to the L. Heel  -There is an intact DTI to the R. Heel (5cm x 5cm) and L. Trochanter (6cm x 5cm) without drainage  -There is evidence of Moisture Associated Skin Damage to the Perianal, Lower Bilateral Gluteus, and Groin Areas  -There is a DTI to the Bilateral Sacral area (10cm x 16cm); with epidermal blistering, separation, red tissue, and drainage present

## 2017-09-19 NOTE — DISCHARGE NOTE ADULT - HOSPITAL COURSE
87 yo with PMH of alzheimer's disease, parkinson's disease, CAD s/p stent 11 years ago  presented with lethargy and poor PO intake will admit the patient for hypernatremia secondary to poor po intake, sepsis due to PNA, JEREMY, and failure to thrive in adult. Pt was afebrile, however had leukocytosis with tachycardia. CT chest revealed LLL pneumonia, possible 2/2 aspiration. Pt was Given 1 dose of Zosyn and vanco and continued on ceftriaxone, azithromycin, and flagyl. Pt found to have hypernatremia of 167 likely 2/2 poor oral intake and severe dehydration with accompanied metabolic encephalopathy. Pt calculated to have free water deficit 9.6L and was planned to have free water deficit replaced in 48-72 hours. Pt kept NPO for aspiration precaution. Pt was kept on D5 1/2NS @100ml/hr with f/u BMP every 6 hours. Pt also found to have acute kidney injury secondary to dehydration pre-renal. Solomon was placed due to d/c flomax and history of BPH, and urine was continued to be monitored. Pt was to have speech and swallow consult due to concerns for dysphagia. Pt was to have nutrition consult in the AM due to history of poor oral intake. Pt continued on carbidopa/levodopa for Parkinson's dis 87 yo with PMH of alzheimer's disease, parkinson's disease, CAD s/p stent 11 years ago  presented with lethargy and poor PO intake will admit the patient for hypernatremia secondary to poor po intake, JEREMY and failure to thrive in adult .     -qSOFA 2  -CT chest shows LLL pna  -NPO  -Afebrile, leukocytosis  -Given Zosyn and vanco  x1  -C/w ceftriaxone, azithromycin, and flagyl for now e. Pt had RRT overnight for respiratory distress. Pt desaturated to 70s and 80s with b/l crackles, likely 2/2 fluid overload. Pt given 80IV lasix PUSH. Morphine given for respiratory distress and labored breathing, given pt's prognosis.  Patient wife Britany (health care proxy) and son Ron decided to continue with comfort measures and inpatient hospice. Pt is DNR/DNI. 85 yo with PMH of alzheimer's disease, parkinson's disease, CAD s/p stent 11 years ago  presented with lethargy and poor PO intake will admit the patient for hypernatremia secondary to poor po intake, sepsis due to PNA, JEREMY, and failure to thrive in adult. Pt was afebrile, however had leukocytosis with tachycardia. CT chest revealed LLL pneumonia, possible 2/2 aspiration. Pt was Given 1 dose of Zosyn and vanco and continued on ceftriaxone, azithromycin, and flagyl. Have discontinued antibiotics upon discharge as per pt family request. Pt found to have hypernatremia of 167 likely 2/2 poor oral intake and severe dehydration with accompanied metabolic encephalopathy. Pt calculated to have free water deficit 9.6L and was planned to have free water deficit replaced in 48-72 hours. Pt kept NPO for aspiration precaution. Pt was kept on D5 1/2NS @100ml/hr with f/u BMP every 6 hours. Pt also found to have acute kidney injury secondary to dehydration pre-renal. Solomon was placed due to d/c flomax and history of BPH, and urine was continued to be monitored. Pt was to have speech and swallow consult due to concerns for dysphagia. Pt was to have nutrition consult in the AM due to history of poor oral intake. Pt continued on carbidopa/levodopa   -qSOFA 2  -CT chest shows LLL pna  -NPO  -Afebrile, leukocytosis  -Given Zosyn and vanco  x1  -C/w ceftriaxone, azithromycin, and flagyl for now for Parkinson's disease. Pt had RRT overnight for respiratory distress. Pt desaturated to 70s and 80s with b/l crackles, likely 2/2 fluid overload. Pt given 80IV lasix PUSH. Morphine given for respiratory distress and labored breathing, given pt's prognosis.  Patient wife Britany (health care proxy) and son Ron decided to continue with comfort measures and inpatient hospice. Pt is DNR/DNI.

## 2017-09-19 NOTE — PROGRESS NOTE ADULT - PROBLEM SELECTOR PLAN 4
-sepsis with concern for  Aspiration PNA  -qSOFA 2  -CT chest shows LLL pna  -NPO  -Afebrile, tachycardia, leukocytosis  -Given Zosyn and vanco  x1  -C/w ceftriaxone, azithromycin, and flagyl for now -sepsis with concern for  Aspiration PNA  -qSOFA 2  -CT chest shows LLL pna  -Afebrile, tachycardia, leukocytosis  -Received Zosyn and vanco  x1 in ED  -C/w IV ceftriaxone, azithromycin, and flagyl for now

## 2017-09-19 NOTE — DISCHARGE NOTE ADULT - MEDICATION SUMMARY - MEDICATIONS TO TAKE
I will START or STAY ON the medications listed below when I get home from the hospital:    HYDROmorphone  -- 0.5 milligram(s) intravenous every 1 to 2 hours  -- Indication: For Pain

## 2017-09-19 NOTE — PROGRESS NOTE ADULT - SUBJECTIVE AND OBJECTIVE BOX
HPI:  86 year old male from home, bed bound, contracted has HHA 11 hrs 7 days a week, history of Parkinson disease, Alzheimer dementia, CAD s/p stent (11 years ago) brought in by home health aid for altered mental status and poor PO intake and lethargy. As per HHA at bedside patient has poor PO intake for last 1 month which has been gradually worsening for last 2 weeks and patient also noted to have cough for last few days and unable to feed because patient HHA complaints of cough and gurgling while trying to feed him. HHA at bed side denies any chest pain, shortness of breath, nausea, vomiting any blood in stools or dysuria or hematuria or any other active complaints .  IN the E.D patient vitals hypotensive 85/57 and HR-98 and RR-26  and pulse oximetry -98 and labs showed a WBC count of 19.1 and lactate -2.9 and elevated BUN AND CREATI -138/6.68 and ICU  was consulted for the patient and ICU evaluated the patient and downgraded the patient after initial evaluation and spoke to patient wife ALEIDA - [phone] HCP and she makes the decision for DNR/DNI and palliative care consulted (18 Sep 2017 14:29)      Patient is a 86y old  Male who presents with a chief complaint of altered mental status (19 Sep 2017 11:27)      INTERVAL HPI/OVERNIGHT EVENTS:    Pt nonverbal. RRT overnight due to respiratory distress. Pt found to have bibasilar crackles indicative of pulmonary edema 2/2 fluid overload. IV hydration 1/2 NS was initially given due to severe hypernatremia 2/2 dehydration. During rapid, IV lasix 80 given, with resolution.       T(C): 37.3 (17 @ 08:23), Max: 37.3 (17 @ 08:23)  HR: 80 (17 @ 08:23) (41 - 95)  BP: 74/45 (17 @ 08:23) (74/45 - 96/87)  RR: 18 (17 @ 08:23) (16 - 32)  SpO2: 98% (17 @ 08:23) (85% - 98%)  Wt(kg): --  I&O's Summary    18 Sep 2017 07:01  -  19 Sep 2017 07:00  --------------------------------------------------------  IN: 700 mL / OUT: 900 mL / NET: -200 mL        REVIEW OF SYSTEMS: denies fever, chills, SOB, palpitations, chest pain, abdominal pain, nausea, vomitting, diarrhea, constipation, dizziness    MEDICATIONS  (STANDING):  cefTRIAXone   IVPB 1 Gram(s) IV Intermittent every 24 hours  metroNIDAZOLE  IVPB      metroNIDAZOLE  IVPB 500 milliGRAM(s) IV Intermittent every 8 hours  azithromycin  IVPB      azithromycin  IVPB 500 milliGRAM(s) IV Intermittent every 24 hours    MEDICATIONS  (PRN):  HYDROmorphone  Injectable 0.5 milliGRAM(s) IV Push every 1 hour PRN tachypnea, labored breathing      PHYSICAL EXAM:  GENERAL: nonverbal, noncommunicative, NAD  NERVOUS SYSTEM:  nonverbal, noncommunicate, unable to obtain full neuro exam  CHEST/LUNG: Clear to auscultation bilaterally; No rales, rhonchi, wheezing, or rubs  HEART: Regular rate and rhythm; No murmurs, rubs, or gallops  ABDOMEN: Soft, Nontender, Nondistended; Bowel sounds present  EXTREMITIES:  2+ Peripheral Pulses, No clubbing, cyanosis, or edema  LYMPH: No lymphadenopathy noted  SKIN: left trochanteric ulcer noted    LABS:                        8.0    13.6  )-----------( 259      ( 19 Sep 2017 09:04 )             27.2         166<HH>  |  136<H>  |  137<H>  ----------------------------<  162<H>  4.3   |  16<L>  |  6.80<H>    Ca    8.8      19 Sep 2017 09:04  Phos  5.9       Mg     3.1         TPro  7.1  /  Alb  2.2<L>  /  TBili  0.5  /  DBili  0.2  /  AST  63<H>  /  ALT  8<L>  /  AlkPhos  60        Urinalysis Basic - ( 18 Sep 2017 11:38 )    Color: Yellow / Appearance: Clear / S.020 / pH: x  Gluc: x / Ketone: Negative  / Bili: Negative / Urobili: Negative   Blood: x / Protein: Negative / Nitrite: Negative   Leuk Esterase: Trace / RBC: x / WBC 0-2 /HPF   Sq Epi: x / Non Sq Epi: Few / Bacteria: Trace /HPF      CAPILLARY BLOOD GLUCOSE            Urinalysis Basic - ( 18 Sep 2017 11:38 )    Color: Yellow / Appearance: Clear / S.020 / pH: x  Gluc: x / Ketone: Negative  / Bili: Negative / Urobili: Negative   Blood: x / Protein: Negative / Nitrite: Negative   Leuk Esterase: Trace / RBC: x / WBC 0-2 /HPF   Sq Epi: x / Non Sq Epi: Few / Bacteria: Trace /HPF HPI:  86 year old male from home, bed bound, contracted has HHA 11 hrs 7 days a week, history of Parkinson disease, Alzheimer dementia, CAD s/p stent (11 years ago) brought in by home health aid for altered mental status and poor PO intake and lethargy. As per HHA at bedside patient has poor PO intake for last 1 month which has been gradually worsening for last 2 weeks and patient also noted to have cough for last few days and unable to feed because patient HHA complaints of cough and gurgling while trying to feed him. HHA at bed side denies any chest pain, shortness of breath, nausea, vomiting any blood in stools or dysuria or hematuria or any other active complaints .  IN the E.D patient vitals hypotensive 85/57 and HR-98 and RR-26  and pulse oximetry -98 and labs showed a WBC count of 19.1 and lactate -2.9 and elevated BUN AND CREATI -138/6.68 and ICU  was consulted for the patient and ICU evaluated the patient and downgraded the patient after initial evaluation and spoke to patient wife ALEIDA - [phone] HCP and she makes the decision for DNR/DNI and palliative care consulted (18 Sep 2017 14:29)      Patient is a 86y old  Male who presents with a chief complaint of altered mental status (19 Sep 2017 11:27)      INTERVAL HPI/OVERNIGHT EVENTS:    Pt nonverbal. RRT overnight due to respiratory distress. Pt found to have bibasilar crackles indicative of pulmonary edema 2/2 fluid overload. IV hydration 1/2 NS was initially given due to severe hypernatremia 2/2 dehydration. During RRT, IV lasix 80 given, with resolution.       REVIEW OF SYSTEMS: Unable to obtain ROS from patient due to patient condition    MEDICATIONS  (STANDING):  cefTRIAXone   IVPB 1 Gram(s) IV Intermittent every 24 hours  metroNIDAZOLE  IVPB      metroNIDAZOLE  IVPB 500 milliGRAM(s) IV Intermittent every 8 hours  azithromycin  IVPB      azithromycin  IVPB 500 milliGRAM(s) IV Intermittent every 24 hours    MEDICATIONS  (PRN):  HYDROmorphone  Injectable 0.5 milliGRAM(s) IV Push every 1 hour PRN tachypnea, labored breathing        T(C): 37.3 (17 @ 08:23), Max: 37.3 (17 @ 08:23)  HR: 80 (17 @ 08:23) (41 - 95)  BP: 74/45 (17 @ 08:23) (74/45 - 96/87)  RR: 18 (17 @ 08:23) (16 - 32)  SpO2: 98% (17 @ 08:23) (85% - 98%)  Wt(kg): --  I&O's Summary    18 Sep 2017 07:01  -  19 Sep 2017 07:00  --------------------------------------------------------  IN: 700 mL / OUT: 900 mL / NET: -200 mL    PHYSICAL EXAM:  GENERAL: nonverbal, non communicative, NAD  Head: AT/NC  EENT: EOMI, moist muciosa  NERVOUS SYSTEM:  nonverbal, non communicate, unable to obtain full neuro exam  CHEST/LUNG: Clear to auscultation bilaterally; No rales, rhonchi, wheezing, or rubs  HEART: Regular rate and rhythm; No murmurs, rubs, or gallops  ABDOMEN: Soft, Nontender, Nondistended; Bowel sounds present  EXTREMITIES:  2+ Peripheral Pulses, No clubbing, cyanosis, or edema  LYMPH: No lymphadenopathy noted  SKIN: left trochanteric ulcer noted    LABS:                        8.0    13.6  )-----------( 259      ( 19 Sep 2017 09:04 )             27.2         166<HH>  |  136<H>  |  137<H>  ----------------------------<  162<H>  4.3   |  16<L>  |  6.80<H>    Ca    8.8      19 Sep 2017 09:04  Phos  5.9       Mg     3.1         TPro  7.1  /  Alb  2.2<L>  /  TBili  0.5  /  DBili  0.2  /  AST  63<H>  /  ALT  8<L>  /  AlkPhos  60        Urinalysis Basic - ( 18 Sep 2017 11:38 )    Color: Yellow / Appearance: Clear / S.020 / pH: x  Gluc: x / Ketone: Negative  / Bili: Negative / Urobili: Negative   Blood: x / Protein: Negative / Nitrite: Negative   Leuk Esterase: Trace / RBC: x / WBC 0-2 /HPF   Sq Epi: x / Non Sq Epi: Few / Bacteria: Trace /HPF      Urinalysis Basic - ( 18 Sep 2017 11:38 )    Color: Yellow / Appearance: Clear / S.020 / pH: x  Gluc: x / Ketone: Negative  / Bili: Negative / Urobili: Negative   Blood: x / Protein: Negative / Nitrite: Negative   Leuk Esterase: Trace / RBC: x / WBC 0-2 /HPF   Sq Epi: x / Non Sq Epi: Few / Bacteria: Trace /HPF

## 2017-09-19 NOTE — PROGRESS NOTE ADULT - SUBJECTIVE AND OBJECTIVE BOX
OVERNIGHT EVENTS:    Present Symptoms:   Dyspnea:   Nausea/Vomiting:   Anxiety:    Depressed Mood:   Fatigue:   Loss of appetite:   Pain:                   location:   Review of Systems: [All others negative or Unable to obtain due to poor mentation]    MEDICATIONS  (STANDING):  cefTRIAXone   IVPB 1 Gram(s) IV Intermittent every 24 hours  metroNIDAZOLE  IVPB      heparin  Injectable 5000 Unit(s) SubCutaneous every 12 hours  metroNIDAZOLE  IVPB 500 milliGRAM(s) IV Intermittent every 8 hours  aspirin  chewable 81 milliGRAM(s) Oral daily  carbidopa/levodopa  25/250 1 Tablet(s) Oral two times a day  azithromycin  IVPB      azithromycin  IVPB 500 milliGRAM(s) IV Intermittent every 24 hours  dextrose 5% + sodium chloride 0.45%. 1000 milliLiter(s) (60 mL/Hr) IV Continuous <Continuous>    MEDICATIONS  (PRN):  morphine  - Injectable 1 milliGRAM(s) IV Push every 2 hours PRN Respiratory distress      PHYSICAL EXAM:  Vital Signs Last 24 Hrs  T(C): 37.3 (19 Sep 2017 08:23), Max: 37.3 (19 Sep 2017 08:23)  T(F): 99.1 (19 Sep 2017 08:23), Max: 99.1 (19 Sep 2017 08:23)  HR: 80 (19 Sep 2017 08:23) (41 - 98)  BP: 74/45 (19 Sep 2017 08:23) (74/45 - 96/87)  BP(mean): --  RR: 18 (19 Sep 2017 08:23) (16 - 32)  SpO2: 98% (19 Sep 2017 08:23) (85% - 98%)  General: alert  oriented x ____    [lethargic distressed cachexia  nonverbal  unarousable verbal]  Karnofsky Performance Score/Palliative Performance Status Version2:    %    HEENT: normal  dry mouth  ET tube/trach oral lesions:  Lungs: comfortable tachypnea/labored breathing  excessive secretions  CV: normal  tachycardia  GI: normal  distended  tender  incontinent               PEG/NG/OG tube  constipation  last BM:   : normal  incontinent  oliguria/anuria  pedro  Musculoskeletal: normal  weakness  edema             ambulatory  bedbound/wheelchair bound  Skin: normal  pressure ulcers: stage: edema: other:  Neuro: no deficits cognitive impairment dsyphagia/dysarthria paresis: other:  Oral intake ability: unable/only mouth care [minimal moderate full capability]  Diet: NPO,     LABS:                          8.0    13.6  )-----------( 259      ( 19 Sep 2017 09:04 )             27.2         166<HH>  |  136<H>  |  137<H>  ----------------------------<  162<H>  4.3   |  16<L>  |  6.80<H>    Ca    8.8      19 Sep 2017 09:04  Phos  5.9       Mg     3.1         TPro  7.1  /  Alb  2.2<L>  /  TBili  0.5  /  DBili  0.2  /  AST  63<H>  /  ALT  8<L>  /  AlkPhos  60      Urinalysis Basic - ( 18 Sep 2017 11:38 )    Color: Yellow / Appearance: Clear / S.020 / pH: x  Gluc: x / Ketone: Negative  / Bili: Negative / Urobili: Negative   Blood: x / Protein: Negative / Nitrite: Negative   Leuk Esterase: Trace / RBC: x / WBC 0-2 /HPF   Sq Epi: x / Non Sq Epi: Few / Bacteria: Trace /HPF        RADIOLOGY & ADDITIONAL STUDIES:    ADVANCE DIRECTIVES:  Advanced Care Planning discussion total time spent: OVERNIGHT EVENTS: s/p RRT last night due to respiratory distress with hypoxia likely secondary to fluid overload, s/p lasix, d/c fluids, worsening renal failure, hypernatremia not improving, and worsening lethargy. family remained committed to comfort care approach and now open to inpt hospice.     Present Symptoms:    Unable to obtain due to poor mentation]    MEDICATIONS  (STANDING):  cefTRIAXone   IVPB 1 Gram(s) IV Intermittent every 24 hours  metroNIDAZOLE  IVPB      heparin  Injectable 5000 Unit(s) SubCutaneous every 12 hours  metroNIDAZOLE  IVPB 500 milliGRAM(s) IV Intermittent every 8 hours  aspirin  chewable 81 milliGRAM(s) Oral daily  carbidopa/levodopa  25/250 1 Tablet(s) Oral two times a day  azithromycin  IVPB      azithromycin  IVPB 500 milliGRAM(s) IV Intermittent every 24 hours  dextrose 5% + sodium chloride 0.45%. 1000 milliLiter(s) (60 mL/Hr) IV Continuous <Continuous>    MEDICATIONS  (PRN):  morphine  - Injectable 1 milliGRAM(s) IV Push every 2 hours PRN Respiratory distress      PHYSICAL EXAM:  Vital Signs Last 24 Hrs  T(C): 37.3 (19 Sep 2017 08:23), Max: 37.3 (19 Sep 2017 08:23)  T(F): 99.1 (19 Sep 2017 08:23), Max: 99.1 (19 Sep 2017 08:23)  HR: 80 (19 Sep 2017 08:23) (41 - 98)  BP: 74/45 (19 Sep 2017 08:23) (74/45 - 96/87)  BP(mean): --  RR: 18 (19 Sep 2017 08:23) (16 - 32)  SpO2: 98% (19 Sep 2017 08:23) (85% - 98%)  General: alert  oriented x 0    [lethargic distressed nonverbal barely able to respond to questions]  Karnofsky Performance Score/Palliative Performance Status Version2: 20-30  %    HEENT: normal  dry mouth  ET tube/trach oral lesions:  Lungs: comfortable tachypnea/labored breathing  excessive secretions  CV: normal  tachycardia  GI: normal  distended  tender  incontinent               PEG/NG/OG tube  constipation  last BM:   : normal  incontinent  oliguria/anuria  pedro  Musculoskeletal: normal  weakness  edema             ambulatory  bedbound/wheelchair bound  Skin: normal  pressure ulcers: stage: edema: other:  Neuro: no deficits cognitive impairment dsyphagia/dysarthria paresis: other:  Oral intake ability: unable/only mouth care [minimal moderate full capability]  Diet: dysphagia    LABS:                          8.0    13.6  )-----------( 259      ( 19 Sep 2017 09:04 )             27.2         166<HH>  |  136<H>  |  137<H>  ----------------------------<  162<H>  4.3   |  16<L>  |  6.80<H>    Ca    8.8      19 Sep 2017 09:04  Phos  5.9       Mg     3.1         TPro  7.1  /  Alb  2.2<L>  /  TBili  0.5  /  DBili  0.2  /  AST  63<H>  /  ALT  8<L>  /  AlkPhos  60      Urinalysis Basic - ( 18 Sep 2017 11:38 )    Color: Yellow / Appearance: Clear / S.020 / pH: x  Gluc: x / Ketone: Negative  / Bili: Negative / Urobili: Negative   Blood: x / Protein: Negative / Nitrite: Negative   Leuk Esterase: Trace / RBC: x / WBC 0-2 /HPF   Sq Epi: x / Non Sq Epi: Few / Bacteria: Trace /HPF        RADIOLOGY & ADDITIONAL STUDIES: new pleural effusion small on CXR    ADVANCE DIRECTIVES: DNR/DNI, no artificial nutrition

## 2017-09-19 NOTE — PROGRESS NOTE ADULT - PROBLEM SELECTOR PLAN 6
- likely secondary to underlying parkinson's disease and gradual worsening with poor po intake   -palliative consulted - Dr. Herring on the case  pt family requesting comfort care, feeding recommendations as per palliative and family  Pt to be placed for inpatient hospice

## 2017-09-20 ENCOUNTER — INPATIENT (INPATIENT)
Facility: HOSPITAL | Age: 82
LOS: 1 days | DRG: 56 | End: 2017-09-22
Attending: INTERNAL MEDICINE | Admitting: INTERNAL MEDICINE
Payer: OTHER MISCELLANEOUS

## 2017-09-20 VITALS
RESPIRATION RATE: 30 BRPM | DIASTOLIC BLOOD PRESSURE: 49 MMHG | SYSTOLIC BLOOD PRESSURE: 82 MMHG | HEART RATE: 81 BPM | OXYGEN SATURATION: 98 % | TEMPERATURE: 99 F

## 2017-09-20 VITALS
DIASTOLIC BLOOD PRESSURE: 40 MMHG | RESPIRATION RATE: 20 BRPM | TEMPERATURE: 99 F | SYSTOLIC BLOOD PRESSURE: 76 MMHG | OXYGEN SATURATION: 97 % | HEART RATE: 104 BPM

## 2017-09-20 DIAGNOSIS — Z51.5 ENCOUNTER FOR PALLIATIVE CARE: ICD-10-CM

## 2017-09-20 DIAGNOSIS — R06.00 DYSPNEA, UNSPECIFIED: ICD-10-CM

## 2017-09-20 DIAGNOSIS — G20 PARKINSON'S DISEASE: ICD-10-CM

## 2017-09-20 DIAGNOSIS — N17.9 ACUTE KIDNEY FAILURE, UNSPECIFIED: ICD-10-CM

## 2017-09-20 DIAGNOSIS — E87.0 HYPEROSMOLALITY AND HYPERNATREMIA: ICD-10-CM

## 2017-09-20 PROCEDURE — 87086 URINE CULTURE/COLONY COUNT: CPT

## 2017-09-20 PROCEDURE — 84443 ASSAY THYROID STIM HORMONE: CPT

## 2017-09-20 PROCEDURE — 87040 BLOOD CULTURE FOR BACTERIA: CPT

## 2017-09-20 PROCEDURE — 71045 X-RAY EXAM CHEST 1 VIEW: CPT

## 2017-09-20 PROCEDURE — 83735 ASSAY OF MAGNESIUM: CPT

## 2017-09-20 PROCEDURE — 96375 TX/PRO/DX INJ NEW DRUG ADDON: CPT

## 2017-09-20 PROCEDURE — 70450 CT HEAD/BRAIN W/O DYE: CPT

## 2017-09-20 PROCEDURE — 99285 EMERGENCY DEPT VISIT HI MDM: CPT | Mod: 25

## 2017-09-20 PROCEDURE — 84100 ASSAY OF PHOSPHORUS: CPT

## 2017-09-20 PROCEDURE — 80061 LIPID PANEL: CPT

## 2017-09-20 PROCEDURE — 80048 BASIC METABOLIC PNL TOTAL CA: CPT

## 2017-09-20 PROCEDURE — 80053 COMPREHEN METABOLIC PANEL: CPT

## 2017-09-20 PROCEDURE — 82607 VITAMIN B-12: CPT

## 2017-09-20 PROCEDURE — 83930 ASSAY OF BLOOD OSMOLALITY: CPT

## 2017-09-20 PROCEDURE — 81001 URINALYSIS AUTO W/SCOPE: CPT

## 2017-09-20 PROCEDURE — 83605 ASSAY OF LACTIC ACID: CPT

## 2017-09-20 PROCEDURE — 71250 CT THORAX DX C-: CPT

## 2017-09-20 PROCEDURE — 84134 ASSAY OF PREALBUMIN: CPT

## 2017-09-20 PROCEDURE — 96374 THER/PROPH/DIAG INJ IV PUSH: CPT

## 2017-09-20 PROCEDURE — 93005 ELECTROCARDIOGRAM TRACING: CPT

## 2017-09-20 PROCEDURE — 80076 HEPATIC FUNCTION PANEL: CPT

## 2017-09-20 PROCEDURE — 85027 COMPLETE CBC AUTOMATED: CPT

## 2017-09-20 PROCEDURE — 99223 1ST HOSP IP/OBS HIGH 75: CPT

## 2017-09-20 RX ORDER — HYDROMORPHONE HYDROCHLORIDE 2 MG/ML
0.5 INJECTION INTRAMUSCULAR; INTRAVENOUS; SUBCUTANEOUS EVERY 4 HOURS
Qty: 0 | Refills: 0 | Status: DISCONTINUED | OUTPATIENT
Start: 2017-09-20 | End: 2017-09-22

## 2017-09-20 RX ORDER — ROBINUL 0.2 MG/ML
0.4 INJECTION INTRAMUSCULAR; INTRAVENOUS EVERY 4 HOURS
Qty: 0 | Refills: 0 | Status: DISCONTINUED | OUTPATIENT
Start: 2017-09-20 | End: 2017-09-22

## 2017-09-20 RX ORDER — HYDROMORPHONE HYDROCHLORIDE 2 MG/ML
0.5 INJECTION INTRAMUSCULAR; INTRAVENOUS; SUBCUTANEOUS
Qty: 0 | Refills: 0 | Status: DISCONTINUED | OUTPATIENT
Start: 2017-09-20 | End: 2017-09-22

## 2017-09-20 RX ADMIN — CEFTRIAXONE 100 GRAM(S): 500 INJECTION, POWDER, FOR SOLUTION INTRAMUSCULAR; INTRAVENOUS at 06:11

## 2017-09-20 RX ADMIN — HYDROMORPHONE HYDROCHLORIDE 0.5 MILLIGRAM(S): 2 INJECTION INTRAMUSCULAR; INTRAVENOUS; SUBCUTANEOUS at 12:45

## 2017-09-20 RX ADMIN — HYDROMORPHONE HYDROCHLORIDE 0.5 MILLIGRAM(S): 2 INJECTION INTRAMUSCULAR; INTRAVENOUS; SUBCUTANEOUS at 18:48

## 2017-09-20 RX ADMIN — HYDROMORPHONE HYDROCHLORIDE 0.5 MILLIGRAM(S): 2 INJECTION INTRAMUSCULAR; INTRAVENOUS; SUBCUTANEOUS at 12:44

## 2017-09-20 RX ADMIN — HYDROMORPHONE HYDROCHLORIDE 0.5 MILLIGRAM(S): 2 INJECTION INTRAMUSCULAR; INTRAVENOUS; SUBCUTANEOUS at 18:49

## 2017-09-20 RX ADMIN — ROBINUL 0.4 MILLIGRAM(S): 0.2 INJECTION INTRAMUSCULAR; INTRAVENOUS at 06:11

## 2017-09-20 RX ADMIN — HYDROMORPHONE HYDROCHLORIDE 0.5 MILLIGRAM(S): 2 INJECTION INTRAMUSCULAR; INTRAVENOUS; SUBCUTANEOUS at 22:29

## 2017-09-20 RX ADMIN — HYDROMORPHONE HYDROCHLORIDE 0.5 MILLIGRAM(S): 2 INJECTION INTRAMUSCULAR; INTRAVENOUS; SUBCUTANEOUS at 22:23

## 2017-09-20 RX ADMIN — Medication 100 MILLIGRAM(S): at 06:12

## 2017-09-20 NOTE — H&P ADULT - PROBLEM SELECTOR PLAN 1
likely due to pneumonia, progressive dementia/parkinson's - dilaudid 0.5 mg IVP q4 ATC and prn, robinul prn, oxygen, no antibiotics as no benefit at this point and requested by HCP/wife

## 2017-09-20 NOTE — H&P ADULT - HISTORY OF PRESENT ILLNESS
88 yo M with PMH of alzheimer's disease, parkinson's disease, CAD s/p stent 11 years ago  presented with lethargy and poor PO intake will admit the patient for hypernatremia secondary to poor po intake, sepsis due to PNA, JEREMY, and failure to thrive in adult. Pt was afebrile, however had leukocytosis with tachycardia. CT chest revealed LLL pneumonia, possible 2/2 aspiration. Pt was Given 1 dose of Zosyn and vanco and continued on ceftriaxone, azithromycin, and flagyl. Have discontinued antibiotics upon discharge as per pt family request. Pt found to have hypernatremia of 167 likely 2/2 poor oral intake and severe dehydration with accompanied metabolic encephalopathy. Pt calculated to have free water deficit 9.6L and was planned to have free water deficit replaced in 48-72 hours. Pt kept NPO for aspiration precaution. Pt was kept on D5 1/2NS @100ml/hr with f/u BMP every 6 hours. Pt also found to have acute kidney injury secondary to dehydration pre-renal. Solomon was placed due to d/c flomax and history of BPH, and urine was continued to be monitored. Palliative consulted for progression of parkinson's, acute renal failure, dysphagia - plan was for likely LTC with hospice and no artificial nutrition  Pt had RRT on 9/18/17 for respiratory distress. Pt desaturated to 70s and 80s with b/l crackles, likely 2/2 fluid overload. Pt given 80IV lasix PUSH. Morphine given for respiratory distress and labored breathing, given pt's prognosis.  Patient wife Britany (health care proxy) and son Ron decided to continue with comfort measures and inpatient hospice. Pt is DNR/DNI.

## 2017-09-20 NOTE — H&P ADULT - NSHPPHYSICALEXAM_GEN_ALL_CORE
barely able to keep eyes open, lethargic, no longer verbal  tachypneic and labored breathing  shallow breath sounds, audible congestion  weakness x4, bedbound,   pallor skin  abd soft  mild edema x4

## 2017-09-20 NOTE — PROGRESS NOTE ADULT - ATTENDING COMMENTS
Medicine attending note:  Patient seen and examined, wife and son at bedside, all questions answered. Patient for discharge to inpatient hospice today. See discharge note for details.  Allegra Rodriguez MD  Time Spent > 40 minutes  9/20/2017
Patient seen and examined. Patient's history, vitals, labs, imaging studies reviewed. Discussed with above resident, agree with note with edits. Plan of care discussed with patient, and wife and agrees, all questions answered.   Allegra Rodriguez MD

## 2017-09-21 PROCEDURE — 99233 SBSQ HOSP IP/OBS HIGH 50: CPT

## 2017-09-21 RX ADMIN — HYDROMORPHONE HYDROCHLORIDE 0.5 MILLIGRAM(S): 2 INJECTION INTRAMUSCULAR; INTRAVENOUS; SUBCUTANEOUS at 06:30

## 2017-09-21 RX ADMIN — HYDROMORPHONE HYDROCHLORIDE 0.5 MILLIGRAM(S): 2 INJECTION INTRAMUSCULAR; INTRAVENOUS; SUBCUTANEOUS at 06:11

## 2017-09-21 RX ADMIN — HYDROMORPHONE HYDROCHLORIDE 0.5 MILLIGRAM(S): 2 INJECTION INTRAMUSCULAR; INTRAVENOUS; SUBCUTANEOUS at 05:03

## 2017-09-21 RX ADMIN — HYDROMORPHONE HYDROCHLORIDE 0.5 MILLIGRAM(S): 2 INJECTION INTRAMUSCULAR; INTRAVENOUS; SUBCUTANEOUS at 03:18

## 2017-09-21 RX ADMIN — HYDROMORPHONE HYDROCHLORIDE 0.5 MILLIGRAM(S): 2 INJECTION INTRAMUSCULAR; INTRAVENOUS; SUBCUTANEOUS at 17:08

## 2017-09-21 RX ADMIN — HYDROMORPHONE HYDROCHLORIDE 0.5 MILLIGRAM(S): 2 INJECTION INTRAMUSCULAR; INTRAVENOUS; SUBCUTANEOUS at 11:50

## 2017-09-21 RX ADMIN — HYDROMORPHONE HYDROCHLORIDE 0.5 MILLIGRAM(S): 2 INJECTION INTRAMUSCULAR; INTRAVENOUS; SUBCUTANEOUS at 12:00

## 2017-09-21 RX ADMIN — HYDROMORPHONE HYDROCHLORIDE 0.5 MILLIGRAM(S): 2 INJECTION INTRAMUSCULAR; INTRAVENOUS; SUBCUTANEOUS at 19:13

## 2017-09-21 NOTE — PROGRESS NOTE ADULT - PROBLEM SELECTOR PLAN 1
likely due to pneumonia, progressive dementia/parkinson's - dilaudid 0.5 mg IVP q4 ATC and prn, robinul prn, oxygen, no antibiotics as no benefit at this point and requested by HCP/wife  more stable today, c/w current management

## 2017-09-21 NOTE — PROGRESS NOTE ADULT - PROBLEM SELECTOR PLAN 4
symptom management and supportive care only  Spoke with hospice chaplain who spoke with wife today to given support  spoke with son Rene on the phone to update him and give him support, family agrees with plan of care as above, prognosis likely hours to days.

## 2017-09-21 NOTE — PROGRESS NOTE ADULT - SUBJECTIVE AND OBJECTIVE BOX
COLIN THAO                    87y  Male    Allergies    No Known Allergies    Intolerances        Symptoms:  Pain (1-10):  Dyspnea:  Nausea/Vomiting:  Secretions:   Agitation:  Symptom Requiring Inpatient Hospice Admission:    Overnight events/interim history:    HPI:  86 yo M with PMH of alzheimer's disease, parkinson's disease, CAD s/p stent 11 years ago  presented with lethargy and poor PO intake will admit the patient for hypernatremia secondary to poor po intake, sepsis due to PNA, JEREMY, and failure to thrive in adult. Pt was afebrile, however had leukocytosis with tachycardia. CT chest revealed LLL pneumonia, possible 2/2 aspiration. Pt was Given 1 dose of Zosyn and vanco and continued on ceftriaxone, azithromycin, and flagyl. Have discontinued antibiotics upon discharge as per pt family request. Pt found to have hypernatremia of 167 likely 2/2 poor oral intake and severe dehydration with accompanied metabolic encephalopathy. Pt calculated to have free water deficit 9.6L and was planned to have free water deficit replaced in 48-72 hours. Pt kept NPO for aspiration precaution. Pt was kept on D5 1/2NS @100ml/hr with f/u BMP every 6 hours. Pt also found to have acute kidney injury secondary to dehydration pre-renal. Solomon was placed due to d/c flomax and history of BPH, and urine was continued to be monitored. Palliative consulted for progression of parkinson's, acute renal failure, dysphagia - plan was for likely LTC with hospice and no artificial nutrition  Pt had RRT on 9/18/17 for respiratory distress. Pt desaturated to 70s and 80s with b/l crackles, likely 2/2 fluid overload. Pt given 80IV lasix PUSH. Morphine given for respiratory distress and labored breathing, given pt's prognosis.  Patient wife Britany (health care proxy) and son Ron decided to continue with comfort measures and inpatient hospice. Pt is DNR/DNI. (20 Sep 2017 15:37)          PPSV2:     Code Status:          MEDICATIONS  (STANDING):  HYDROmorphone  Injectable 0.5 milliGRAM(s) IV Push every 4 hours    MEDICATIONS  (PRN):  HYDROmorphone  Injectable 0.5 milliGRAM(s) IV Push every 1 hour PRN tachypnea, labored breathing  glycopyrrolate Injectable 0.4 milliGRAM(s) IV Push every 4 hours PRN secretions  LORazepam   Injectable 2 milliGRAM(s) IV Push every 1 hour PRN Agitation                             Vital Signs Last 24 Hrs  T(C): 37.1 (21 Sep 2017 07:10), Max: 37.1 (20 Sep 2017 15:46)  T(F): 98.8 (21 Sep 2017 07:10), Max: 98.8 (20 Sep 2017 15:46)  HR: 71 (21 Sep 2017 07:10) (71 - 81)  BP: 68/46 (21 Sep 2017 07:10) (68/46 - 82/49)  BP(mean): --  RR: 20 (21 Sep 2017 07:10) (20 - 30)  SpO2: 96% (21 Sep 2017 07:10) (96% - 98%)    PHYSICAL EXAM:      Constitutional:    Eyes:    ENMT:    Neck:    Breasts:    Back:    Respiratory:    Cardiovascular:    Gastrointestinal:    Genitourinary:    Rectal:    Extremities:    Vascular:    Neurological:    Skin:    Lymph Nodes:    Musculoskeletal:    Psychiatric: COLIN THAO                    87y  Male    Allergies    No Known Allergies    Intolerances        Symptoms:    Dyspnea: more stable    Symptom Requiring Inpatient Hospice Admission: for respiratory distress requiring IV dilaudid    Overnight events/interim history: respiratory distress more stable, barely arousable    HPI:  86 yo M with PMH of alzheimer's disease, parkinson's disease, CAD s/p stent 11 years ago  presented with lethargy and poor PO intake will admit the patient for hypernatremia secondary to poor po intake, sepsis due to PNA, JEREMY, and failure to thrive in adult. Pt was afebrile, however had leukocytosis with tachycardia. CT chest revealed LLL pneumonia, possible 2/2 aspiration. Pt was Given 1 dose of Zosyn and vanco and continued on ceftriaxone, azithromycin, and flagyl. Have discontinued antibiotics upon discharge as per pt family request. Pt found to have hypernatremia of 167 likely 2/2 poor oral intake and severe dehydration with accompanied metabolic encephalopathy. Pt calculated to have free water deficit 9.6L and was planned to have free water deficit replaced in 48-72 hours. Pt kept NPO for aspiration precaution. Pt was kept on D5 1/2NS @100ml/hr with f/u BMP every 6 hours. Pt also found to have acute kidney injury secondary to dehydration pre-renal. Solomon was placed due to d/c flomax and history of BPH, and urine was continued to be monitored. Palliative consulted for progression of parkinson's, acute renal failure, dysphagia - plan was for likely LTC with hospice and no artificial nutrition  Pt had RRT on 9/18/17 for respiratory distress. Pt desaturated to 70s and 80s with b/l crackles, likely 2/2 fluid overload. Pt given 80IV lasix PUSH. Morphine given for respiratory distress and labored breathing, given pt's prognosis.  Patient wife Britany (health care proxy) and son Ron decided to continue with comfort measures and inpatient hospice. Pt is DNR/DNI. (20 Sep 2017 15:37)          PPSV2: 20%    Code Status: DNR/DNI        MEDICATIONS  (STANDING):  HYDROmorphone  Injectable 0.5 milliGRAM(s) IV Push every 4 hours    MEDICATIONS  (PRN):  HYDROmorphone  Injectable 0.5 milliGRAM(s) IV Push every 1 hour PRN tachypnea, labored breathing  glycopyrrolate Injectable 0.4 milliGRAM(s) IV Push every 4 hours PRN secretions  LORazepam   Injectable 2 milliGRAM(s) IV Push every 1 hour PRN Agitation                             Vital Signs Last 24 Hrs  T(C): 37.1 (21 Sep 2017 07:10), Max: 37.1 (20 Sep 2017 15:46)  T(F): 98.8 (21 Sep 2017 07:10), Max: 98.8 (20 Sep 2017 15:46)  HR: 71 (21 Sep 2017 07:10) (71 - 81)  BP: 68/46 (21 Sep 2017 07:10) (68/46 - 82/49)  BP(mean): --  RR: 20 (21 Sep 2017 07:10) (20 - 30)  SpO2: 96% (21 Sep 2017 07:10) (96% - 98%)    PHYSICAL EXAM:   barely arousable, mildly tachypneic but more stable respiratory wise  contracted, weakness x4  shallow breathing, dry MM

## 2017-09-22 VITALS
RESPIRATION RATE: 16 BRPM | TEMPERATURE: 99 F | DIASTOLIC BLOOD PRESSURE: 27 MMHG | HEART RATE: 102 BPM | SYSTOLIC BLOOD PRESSURE: 60 MMHG | OXYGEN SATURATION: 80 %

## 2017-09-22 PROCEDURE — 99233 SBSQ HOSP IP/OBS HIGH 50: CPT

## 2017-09-22 RX ADMIN — HYDROMORPHONE HYDROCHLORIDE 0.5 MILLIGRAM(S): 2 INJECTION INTRAMUSCULAR; INTRAVENOUS; SUBCUTANEOUS at 04:41

## 2017-09-22 RX ADMIN — HYDROMORPHONE HYDROCHLORIDE 0.5 MILLIGRAM(S): 2 INJECTION INTRAMUSCULAR; INTRAVENOUS; SUBCUTANEOUS at 14:40

## 2017-09-22 RX ADMIN — HYDROMORPHONE HYDROCHLORIDE 0.5 MILLIGRAM(S): 2 INJECTION INTRAMUSCULAR; INTRAVENOUS; SUBCUTANEOUS at 06:00

## 2017-09-22 RX ADMIN — HYDROMORPHONE HYDROCHLORIDE 0.5 MILLIGRAM(S): 2 INJECTION INTRAMUSCULAR; INTRAVENOUS; SUBCUTANEOUS at 06:33

## 2017-09-22 RX ADMIN — HYDROMORPHONE HYDROCHLORIDE 0.5 MILLIGRAM(S): 2 INJECTION INTRAMUSCULAR; INTRAVENOUS; SUBCUTANEOUS at 05:53

## 2017-09-22 RX ADMIN — HYDROMORPHONE HYDROCHLORIDE 0.5 MILLIGRAM(S): 2 INJECTION INTRAMUSCULAR; INTRAVENOUS; SUBCUTANEOUS at 10:20

## 2017-09-22 NOTE — DISCHARGE NOTE FOR THE EXPIRED PATIENT - HOSPITAL COURSE
88 yo M with PMH of alzheimer's disease, parkinson's disease, CAD s/p stent 11 years ago  presented with lethargy and poor PO intake will admit the patient for hypernatremia secondary to poor po intake, sepsis due to PNA, JEREMY, and failure to thrive in adult. Pt was afebrile, however had leukocytosis with tachycardia. CT chest revealed LLL pneumonia, possible 2/2 aspiration. Pt was Given 1 dose of Zosyn and vanco and continued on ceftriaxone, azithromycin, and flagyl. Have discontinued antibiotics upon discharge as per pt family request. Pt found to have hypernatremia of 167 likely 2/2 poor oral intake and severe dehydration with accompanied metabolic encephalopathy. Pt calculated to have free water deficit 9.6L and was planned to have free water deficit replaced in 48-72 hours. Pt kept NPO for aspiration precaution. Pt was kept on D5 1/2NS @100ml/hr with f/u BMP every 6 hours. Pt also found to have acute kidney injury secondary to dehydration pre-renal. Solomon was placed due to d/c flomax and history of BPH, and urine was continued to be monitored. Palliative consulted for progression of parkinson's, acute renal failure, dysphagia - plan was for likely LTC with hospice and no artificial nutrition  Pt had RRT on 9/18/17 for respiratory distress. Pt desaturated to 70s and 80s with b/l crackles, likely 2/2 fluid overload. Pt given 80IV lasix PUSH. Morphine given for respiratory distress and labored breathing, given pt's prognosis.  Patient wife Britany (health care proxy) and son Ron decided to continue with comfort measures and inpatient hospice. Pt is DNR/DNI.     Patient had Respiratory distress, acute. likely due to pneumonia, progressive dementia/parkinson's - dilaudid 0.5 mg IVP q4 ATC and prn, robinul prn, oxygen, no antibiotics as no benefit at this point and requested by HCP/wife. patient was having laboured breathing since morning.      KI (acute kidney injury) was treated with supportive care only, severe renal failure with oliguria.    Parkinson's disease.  patient was in endstage, inpt hospice continue.    End of life care. Patient had symptomatic management and supportive care only  attending physician Spoke with hospice chaplain who spoke with wife today to given support  attending physician spoke with son Rene on the phone to update him and give him support, family agrees with plan of care as above, prognosis likely hours to days.     Patient passed away today at 5:17 Pm. 86 yo M with PMH of alzheimer's disease, parkinson's disease, CAD s/p stent 11 years ago  presented with lethargy and poor PO intake will admit the patient for hypernatremia secondary to poor po intake, sepsis due to PNA, JEREMY, and failure to thrive in adult. Pt was afebrile, however had leukocytosis with tachycardia. CT chest revealed LLL pneumonia, possible 2/2 aspiration. Pt was Given 1 dose of Zosyn and vanco and continued on ceftriaxone, azithromycin, and flagyl. Have discontinued antibiotics upon discharge as per pt family request. Pt found to have hypernatremia of 167 likely 2/2 poor oral intake and severe dehydration with accompanied metabolic encephalopathy. Pt calculated to have free water deficit 9.6L and was planned to have free water deficit replaced in 48-72 hours. Pt kept NPO for aspiration precaution. Pt was kept on D5 1/2NS @100ml/hr with f/u BMP every 6 hours. Pt also found to have acute kidney injury secondary to dehydration pre-renal. Solomon was placed due to d/c flomax and history of BPH, and urine was continued to be monitored. Palliative consulted for progression of parkinson's, acute renal failure, dysphagia - plan was for likely LTC with hospice and no artificial nutrition  Pt had RRT on 9/18/17 for respiratory distress. Pt desaturated to 70s and 80s with b/l crackles, likely 2/2 fluid overload. Pt given 80IV lasix PUSH. Morphine given for respiratory distress and labored breathing, given pt's prognosis.  Patient wife Britany (health care proxy) and son Ron decided to continue with comfort measures and inpatient hospice. Pt is DNR/DNI.     Patient had Respiratory distress, acute. likely due to pneumonia, progressive dementia/parkinson's - dilaudid 0.5 mg IVP q4 ATC and prn, robinul prn, oxygen, no antibiotics as no benefit at this point and requested by HCP/wife. patient was having laboured breathing since morning.      KI (acute kidney injury) was treated with supportive care only, severe renal failure with oliguria.    Parkinson's disease.  patient was in endstage, inpt hospice continue.    End of life care. Patient had symptomatic management and supportive care only  attending physician Spoke with hospice chaplain who spoke with wife today to given support  attending physician spoke with son Rene on the phone to update him and give him support, family agrees with plan of care as above, prognosis likely hours to days.     Patient passed away today at 5:17 Pm.     Autopsy not indicated

## 2017-09-23 LAB
CULTURE RESULTS: SIGNIFICANT CHANGE UP
CULTURE RESULTS: SIGNIFICANT CHANGE UP
SPECIMEN SOURCE: SIGNIFICANT CHANGE UP
SPECIMEN SOURCE: SIGNIFICANT CHANGE UP

## 2017-09-24 DIAGNOSIS — F02.80 DEMENTIA IN OTHER DISEASES CLASSIFIED ELSEWHERE, UNSPECIFIED SEVERITY, WITHOUT BEHAVIORAL DISTURBANCE, PSYCHOTIC DISTURBANCE, MOOD DISTURBANCE, AND ANXIETY: ICD-10-CM

## 2017-09-24 DIAGNOSIS — R06.00 DYSPNEA, UNSPECIFIED: ICD-10-CM

## 2017-09-24 DIAGNOSIS — G93.41 METABOLIC ENCEPHALOPATHY: ICD-10-CM

## 2017-09-24 DIAGNOSIS — Z66 DO NOT RESUSCITATE: ICD-10-CM

## 2017-09-24 DIAGNOSIS — R53.83 OTHER FATIGUE: ICD-10-CM

## 2017-09-24 DIAGNOSIS — A41.9 SEPSIS, UNSPECIFIED ORGANISM: ICD-10-CM

## 2017-09-24 DIAGNOSIS — R13.12 DYSPHAGIA, OROPHARYNGEAL PHASE: ICD-10-CM

## 2017-09-24 DIAGNOSIS — N17.9 ACUTE KIDNEY FAILURE, UNSPECIFIED: ICD-10-CM

## 2017-09-24 DIAGNOSIS — E87.0 HYPEROSMOLALITY AND HYPERNATREMIA: ICD-10-CM

## 2017-09-24 DIAGNOSIS — R62.7 ADULT FAILURE TO THRIVE: ICD-10-CM

## 2017-09-24 DIAGNOSIS — N18.9 CHRONIC KIDNEY DISEASE, UNSPECIFIED: ICD-10-CM

## 2017-09-24 DIAGNOSIS — Z74.01 BED CONFINEMENT STATUS: ICD-10-CM

## 2017-09-24 DIAGNOSIS — J69.0 PNEUMONITIS DUE TO INHALATION OF FOOD AND VOMIT: ICD-10-CM

## 2017-09-24 DIAGNOSIS — G20 PARKINSON'S DISEASE: ICD-10-CM

## 2017-09-24 DIAGNOSIS — E86.0 DEHYDRATION: ICD-10-CM

## 2017-09-24 DIAGNOSIS — R09.02 HYPOXEMIA: ICD-10-CM

## 2017-09-24 DIAGNOSIS — R60.1 GENERALIZED EDEMA: ICD-10-CM

## 2017-09-24 DIAGNOSIS — G30.9 ALZHEIMER'S DISEASE, UNSPECIFIED: ICD-10-CM

## 2017-09-24 DIAGNOSIS — Z51.5 ENCOUNTER FOR PALLIATIVE CARE: ICD-10-CM

## 2017-09-24 DIAGNOSIS — Z95.5 PRESENCE OF CORONARY ANGIOPLASTY IMPLANT AND GRAFT: ICD-10-CM

## 2017-09-25 DIAGNOSIS — F02.80 DEMENTIA IN OTHER DISEASES CLASSIFIED ELSEWHERE, UNSPECIFIED SEVERITY, WITHOUT BEHAVIORAL DISTURBANCE, PSYCHOTIC DISTURBANCE, MOOD DISTURBANCE, AND ANXIETY: ICD-10-CM

## 2017-09-25 DIAGNOSIS — R53.83 OTHER FATIGUE: ICD-10-CM

## 2017-09-25 DIAGNOSIS — E87.0 HYPEROSMOLALITY AND HYPERNATREMIA: ICD-10-CM

## 2017-09-25 DIAGNOSIS — G30.9 ALZHEIMER'S DISEASE, UNSPECIFIED: ICD-10-CM

## 2017-09-25 DIAGNOSIS — J69.0 PNEUMONITIS DUE TO INHALATION OF FOOD AND VOMIT: ICD-10-CM

## 2017-09-25 DIAGNOSIS — N40.0 BENIGN PROSTATIC HYPERPLASIA WITHOUT LOWER URINARY TRACT SYMPTOMS: ICD-10-CM

## 2017-09-25 DIAGNOSIS — G20 PARKINSON'S DISEASE: ICD-10-CM

## 2017-09-25 DIAGNOSIS — R06.00 DYSPNEA, UNSPECIFIED: ICD-10-CM

## 2017-09-25 DIAGNOSIS — D64.9 ANEMIA, UNSPECIFIED: ICD-10-CM

## 2017-09-25 DIAGNOSIS — E86.0 DEHYDRATION: ICD-10-CM

## 2017-09-25 DIAGNOSIS — Z51.5 ENCOUNTER FOR PALLIATIVE CARE: ICD-10-CM

## 2017-09-25 DIAGNOSIS — I25.10 ATHEROSCLEROTIC HEART DISEASE OF NATIVE CORONARY ARTERY WITHOUT ANGINA PECTORIS: ICD-10-CM

## 2017-09-25 DIAGNOSIS — Z95.5 PRESENCE OF CORONARY ANGIOPLASTY IMPLANT AND GRAFT: ICD-10-CM

## 2017-09-25 DIAGNOSIS — Z66 DO NOT RESUSCITATE: ICD-10-CM

## 2017-09-25 DIAGNOSIS — G40.909 EPILEPSY, UNSPECIFIED, NOT INTRACTABLE, WITHOUT STATUS EPILEPTICUS: ICD-10-CM

## 2024-08-27 NOTE — ED ADULT TRIAGE NOTE - ARRIVAL FROM
Price (Do Not Change): 0.00 Instructions: This plan will send the code FBSE to the PM system.  DO NOT or CHANGE the price. Detail Level: Simple Home